# Patient Record
Sex: MALE | Race: WHITE | Employment: OTHER | URBAN - METROPOLITAN AREA
[De-identification: names, ages, dates, MRNs, and addresses within clinical notes are randomized per-mention and may not be internally consistent; named-entity substitution may affect disease eponyms.]

---

## 2017-01-01 ENCOUNTER — APPOINTMENT (OUTPATIENT)
Dept: SPEECH THERAPY | Facility: CLINIC | Age: 82
DRG: 865 | End: 2017-01-01
Attending: INTERNAL MEDICINE
Payer: COMMERCIAL

## 2017-01-01 ENCOUNTER — HOSPITAL ENCOUNTER (INPATIENT)
Facility: CLINIC | Age: 82
LOS: 6 days | DRG: 865 | End: 2017-02-12
Attending: EMERGENCY MEDICINE | Admitting: INTERNAL MEDICINE
Payer: COMMERCIAL

## 2017-01-01 ENCOUNTER — APPOINTMENT (OUTPATIENT)
Dept: GENERAL RADIOLOGY | Facility: CLINIC | Age: 82
DRG: 865 | End: 2017-01-01
Attending: INTERNAL MEDICINE
Payer: COMMERCIAL

## 2017-01-01 ENCOUNTER — APPOINTMENT (OUTPATIENT)
Dept: CT IMAGING | Facility: CLINIC | Age: 82
DRG: 865 | End: 2017-01-01
Attending: INTERNAL MEDICINE
Payer: COMMERCIAL

## 2017-01-01 VITALS
BODY MASS INDEX: 22.53 KG/M2 | WEIGHT: 152.12 LBS | HEART RATE: 103 BPM | DIASTOLIC BLOOD PRESSURE: 86 MMHG | OXYGEN SATURATION: 96 % | SYSTOLIC BLOOD PRESSURE: 132 MMHG | RESPIRATION RATE: 22 BRPM | TEMPERATURE: 97.6 F | HEIGHT: 69 IN

## 2017-01-01 DIAGNOSIS — B02.0 ZOSTER ENCEPHALITIS: ICD-10-CM

## 2017-01-01 DIAGNOSIS — M62.81 GENERALIZED MUSCLE WEAKNESS: ICD-10-CM

## 2017-01-01 DIAGNOSIS — R41.82 ALTERED MENTAL STATUS, UNSPECIFIED ALTERED MENTAL STATUS TYPE: ICD-10-CM

## 2017-01-01 LAB
ALBUMIN SERPL-MCNC: 3.4 G/DL (ref 3.4–5)
ALBUMIN UR-MCNC: 30 MG/DL
ALP SERPL-CCNC: 85 U/L (ref 40–150)
ALT SERPL W P-5'-P-CCNC: 15 U/L (ref 0–70)
ANION GAP SERPL CALCULATED.3IONS-SCNC: 11 MMOL/L (ref 3–14)
ANION GAP SERPL CALCULATED.3IONS-SCNC: 11 MMOL/L (ref 3–14)
ANION GAP SERPL CALCULATED.3IONS-SCNC: 8 MMOL/L (ref 3–14)
ANION GAP SERPL CALCULATED.3IONS-SCNC: 8 MMOL/L (ref 3–14)
ANION GAP SERPL CALCULATED.3IONS-SCNC: 9 MMOL/L (ref 3–14)
APPEARANCE UR: CLEAR
AST SERPL W P-5'-P-CCNC: 28 U/L (ref 0–45)
BACTERIA SPEC CULT: NO GROWTH
BACTERIA SPEC CULT: NO GROWTH
BASE DEFICIT BLDA-SCNC: 1.4 MMOL/L
BASOPHILS # BLD AUTO: 0 10E9/L (ref 0–0.2)
BASOPHILS NFR BLD AUTO: 0.8 %
BILIRUB SERPL-MCNC: 0.7 MG/DL (ref 0.2–1.3)
BILIRUB UR QL STRIP: NEGATIVE
BUN SERPL-MCNC: 31 MG/DL (ref 7–30)
BUN SERPL-MCNC: 32 MG/DL (ref 7–30)
BUN SERPL-MCNC: 32 MG/DL (ref 7–30)
BUN SERPL-MCNC: 33 MG/DL (ref 7–30)
BUN SERPL-MCNC: 34 MG/DL (ref 7–30)
CALCIUM SERPL-MCNC: 7.6 MG/DL (ref 8.5–10.1)
CALCIUM SERPL-MCNC: 7.9 MG/DL (ref 8.5–10.1)
CALCIUM SERPL-MCNC: 8 MG/DL (ref 8.5–10.1)
CALCIUM SERPL-MCNC: 8.1 MG/DL (ref 8.5–10.1)
CALCIUM SERPL-MCNC: 8.5 MG/DL (ref 8.5–10.1)
CHLORIDE SERPL-SCNC: 102 MMOL/L (ref 94–109)
CHLORIDE SERPL-SCNC: 103 MMOL/L (ref 94–109)
CHLORIDE SERPL-SCNC: 105 MMOL/L (ref 94–109)
CO2 BLDCOV-SCNC: 28 MMOL/L (ref 21–28)
CO2 SERPL-SCNC: 21 MMOL/L (ref 20–32)
CO2 SERPL-SCNC: 23 MMOL/L (ref 20–32)
CO2 SERPL-SCNC: 23 MMOL/L (ref 20–32)
CO2 SERPL-SCNC: 24 MMOL/L (ref 20–32)
CO2 SERPL-SCNC: 27 MMOL/L (ref 20–32)
COLOR UR AUTO: YELLOW
CREAT SERPL-MCNC: 1.67 MG/DL (ref 0.66–1.25)
CREAT SERPL-MCNC: 1.71 MG/DL (ref 0.66–1.25)
CREAT SERPL-MCNC: 1.73 MG/DL (ref 0.66–1.25)
CREAT SERPL-MCNC: 1.77 MG/DL (ref 0.66–1.25)
CREAT SERPL-MCNC: 1.79 MG/DL (ref 0.66–1.25)
CREAT SERPL-MCNC: 1.88 MG/DL (ref 0.66–1.25)
DIFFERENTIAL METHOD BLD: ABNORMAL
EOSINOPHIL # BLD AUTO: 0.1 10E9/L (ref 0–0.7)
EOSINOPHIL NFR BLD AUTO: 1.6 %
ERYTHROCYTE [DISTWIDTH] IN BLOOD BY AUTOMATED COUNT: 13.8 % (ref 10–15)
ERYTHROCYTE [DISTWIDTH] IN BLOOD BY AUTOMATED COUNT: 13.9 % (ref 10–15)
ERYTHROCYTE [DISTWIDTH] IN BLOOD BY AUTOMATED COUNT: 14.2 % (ref 10–15)
FLUAV+FLUBV AG SPEC QL: NEGATIVE
FLUAV+FLUBV AG SPEC QL: NORMAL
GFR SERPL CREATININE-BSD FRML MDRD: 34 ML/MIN/1.7M2
GFR SERPL CREATININE-BSD FRML MDRD: 36 ML/MIN/1.7M2
GFR SERPL CREATININE-BSD FRML MDRD: 36 ML/MIN/1.7M2
GFR SERPL CREATININE-BSD FRML MDRD: 37 ML/MIN/1.7M2
GFR SERPL CREATININE-BSD FRML MDRD: 38 ML/MIN/1.7M2
GFR SERPL CREATININE-BSD FRML MDRD: 39 ML/MIN/1.7M2
GLUCOSE SERPL-MCNC: 105 MG/DL (ref 70–99)
GLUCOSE SERPL-MCNC: 115 MG/DL (ref 70–99)
GLUCOSE SERPL-MCNC: 124 MG/DL (ref 70–99)
GLUCOSE SERPL-MCNC: 129 MG/DL (ref 70–99)
GLUCOSE SERPL-MCNC: 93 MG/DL (ref 70–99)
GLUCOSE UR STRIP-MCNC: NEGATIVE MG/DL
HCO3 BLD-SCNC: 23 MMOL/L (ref 21–28)
HCT VFR BLD AUTO: 29.9 % (ref 40–53)
HCT VFR BLD AUTO: 31.1 % (ref 40–53)
HCT VFR BLD AUTO: 32 % (ref 40–53)
HCT VFR BLD AUTO: 33.6 % (ref 40–53)
HCT VFR BLD AUTO: 38 % (ref 40–53)
HGB BLD-MCNC: 10.4 G/DL (ref 13.3–17.7)
HGB BLD-MCNC: 10.6 G/DL (ref 13.3–17.7)
HGB BLD-MCNC: 11 G/DL (ref 13.3–17.7)
HGB BLD-MCNC: 11.3 G/DL (ref 13.3–17.7)
HGB BLD-MCNC: 12.6 G/DL (ref 13.3–17.7)
HGB UR QL STRIP: ABNORMAL
HYALINE CASTS #/AREA URNS LPF: 1 /LPF (ref 0–2)
IMM GRANULOCYTES # BLD: 0 10E9/L (ref 0–0.4)
IMM GRANULOCYTES NFR BLD: 0.3 %
INR PPP: 1.31 (ref 0.86–1.14)
INR PPP: 1.4 (ref 0.86–1.14)
INR PPP: 1.81 (ref 0.86–1.14)
INR PPP: 1.83 (ref 0.86–1.14)
INR PPP: 2.04 (ref 0.86–1.14)
INR PPP: 2.24 (ref 0.86–1.14)
INTERPRETATION ECG - MUSE: NORMAL
KETONES UR STRIP-MCNC: NEGATIVE MG/DL
LACTATE BLD-SCNC: 1.4 MMOL/L (ref 0.7–2.1)
LACTATE BLD-SCNC: 1.8 MMOL/L (ref 0.7–2.1)
LEUKOCYTE ESTERASE UR QL STRIP: NEGATIVE
LMWH PPP CHRO-ACNC: 0.14 IU/ML
LMWH PPP CHRO-ACNC: 0.2 IU/ML
LMWH PPP CHRO-ACNC: 0.23 IU/ML
LMWH PPP CHRO-ACNC: 0.29 IU/ML
LYMPHOCYTES # BLD AUTO: 0.8 10E9/L (ref 0.8–5.3)
LYMPHOCYTES NFR BLD AUTO: 21.1 %
Lab: NORMAL
Lab: NORMAL
MCH RBC QN AUTO: 31.6 PG (ref 26.5–33)
MCH RBC QN AUTO: 31.9 PG (ref 26.5–33)
MCH RBC QN AUTO: 32 PG (ref 26.5–33)
MCH RBC QN AUTO: 32.3 PG (ref 26.5–33)
MCH RBC QN AUTO: 32.6 PG (ref 26.5–33)
MCHC RBC AUTO-ENTMCNC: 33.2 G/DL (ref 31.5–36.5)
MCHC RBC AUTO-ENTMCNC: 33.6 G/DL (ref 31.5–36.5)
MCHC RBC AUTO-ENTMCNC: 34.1 G/DL (ref 31.5–36.5)
MCHC RBC AUTO-ENTMCNC: 34.4 G/DL (ref 31.5–36.5)
MCHC RBC AUTO-ENTMCNC: 34.8 G/DL (ref 31.5–36.5)
MCV RBC AUTO: 92 FL (ref 78–100)
MCV RBC AUTO: 93 FL (ref 78–100)
MCV RBC AUTO: 94 FL (ref 78–100)
MCV RBC AUTO: 95 FL (ref 78–100)
MCV RBC AUTO: 98 FL (ref 78–100)
MICRO REPORT STATUS: NORMAL
MICRO REPORT STATUS: NORMAL
MONOCYTES # BLD AUTO: 0.4 10E9/L (ref 0–1.3)
MONOCYTES NFR BLD AUTO: 10.1 %
NEUTROPHILS # BLD AUTO: 2.5 10E9/L (ref 1.6–8.3)
NEUTROPHILS NFR BLD AUTO: 66.1 %
NITRATE UR QL: NEGATIVE
NRBC # BLD AUTO: 0 10*3/UL
NRBC BLD AUTO-RTO: 0 /100
OXYHGB MFR BLD: 97 % (ref 92–100)
PCO2 BLD: 33 MM HG (ref 35–45)
PCO2 BLDV: 47 MM HG (ref 40–50)
PH BLD: 7.44 PH (ref 7.35–7.45)
PH BLDV: 7.38 PH (ref 7.32–7.43)
PH UR STRIP: 6.5 PH (ref 5–7)
PLATELET # BLD AUTO: 100 10E9/L (ref 150–450)
PLATELET # BLD AUTO: 118 10E9/L (ref 150–450)
PLATELET # BLD AUTO: 126 10E9/L (ref 150–450)
PLATELET # BLD AUTO: 86 10E9/L (ref 150–450)
PLATELET # BLD AUTO: 99 10E9/L (ref 150–450)
PO2 BLD: 78 MM HG (ref 80–105)
PO2 BLDV: 39 MM HG (ref 25–47)
POTASSIUM SERPL-SCNC: 3.7 MMOL/L (ref 3.4–5.3)
POTASSIUM SERPL-SCNC: 3.8 MMOL/L (ref 3.4–5.3)
POTASSIUM SERPL-SCNC: 3.9 MMOL/L (ref 3.4–5.3)
POTASSIUM SERPL-SCNC: 4.5 MMOL/L (ref 3.4–5.3)
POTASSIUM SERPL-SCNC: 4.8 MMOL/L (ref 3.4–5.3)
PROT SERPL-MCNC: 6.8 G/DL (ref 6.8–8.8)
RBC # BLD AUTO: 3.25 10E12/L (ref 4.4–5.9)
RBC # BLD AUTO: 3.35 10E12/L (ref 4.4–5.9)
RBC # BLD AUTO: 3.41 10E12/L (ref 4.4–5.9)
RBC # BLD AUTO: 3.54 10E12/L (ref 4.4–5.9)
RBC # BLD AUTO: 3.87 10E12/L (ref 4.4–5.9)
RBC #/AREA URNS AUTO: 13 /HPF (ref 0–2)
SAO2 % BLDV FROM PO2: 72 %
SODIUM SERPL-SCNC: 134 MMOL/L (ref 133–144)
SODIUM SERPL-SCNC: 134 MMOL/L (ref 133–144)
SODIUM SERPL-SCNC: 137 MMOL/L (ref 133–144)
SP GR UR STRIP: 1.01 (ref 1–1.03)
SPECIMEN SOURCE: NORMAL
TROPONIN I SERPL-MCNC: NORMAL UG/L (ref 0–0.04)
URN SPEC COLLECT METH UR: ABNORMAL
UROBILINOGEN UR STRIP-MCNC: NORMAL MG/DL (ref 0–2)
WBC # BLD AUTO: 3.8 10E9/L (ref 4–11)
WBC # BLD AUTO: 4.4 10E9/L (ref 4–11)
WBC # BLD AUTO: 5.1 10E9/L (ref 4–11)
WBC # BLD AUTO: 5.5 10E9/L (ref 4–11)
WBC # BLD AUTO: 5.6 10E9/L (ref 4–11)
WBC #/AREA URNS AUTO: 0 /HPF (ref 0–2)

## 2017-01-01 PROCEDURE — 25000125 ZZHC RX 250: Performed by: INTERNAL MEDICINE

## 2017-01-01 PROCEDURE — 36415 COLL VENOUS BLD VENIPUNCTURE: CPT | Performed by: INTERNAL MEDICINE

## 2017-01-01 PROCEDURE — 99233 SBSQ HOSP IP/OBS HIGH 50: CPT | Performed by: HOSPITALIST

## 2017-01-01 PROCEDURE — 25000125 ZZHC RX 250: Performed by: EMERGENCY MEDICINE

## 2017-01-01 PROCEDURE — 40000915 ZZH STATISTIC SITTER, EVENING HOURS

## 2017-01-01 PROCEDURE — 25000125 ZZHC RX 250: Performed by: HOSPITALIST

## 2017-01-01 PROCEDURE — 25000128 H RX IP 250 OP 636: Performed by: INTERNAL MEDICINE

## 2017-01-01 PROCEDURE — 85025 COMPLETE CBC W/AUTO DIFF WBC: CPT | Performed by: EMERGENCY MEDICINE

## 2017-01-01 PROCEDURE — 12000000 ZZH R&B MED SURG/OB

## 2017-01-01 PROCEDURE — 40000141 ZZH STATISTIC PERIPHERAL IV START W/O US GUIDANCE

## 2017-01-01 PROCEDURE — 80048 BASIC METABOLIC PNL TOTAL CA: CPT | Performed by: INTERNAL MEDICINE

## 2017-01-01 PROCEDURE — 99211 OFF/OP EST MAY X REQ PHY/QHP: CPT

## 2017-01-01 PROCEDURE — 51701 INSERT BLADDER CATHETER: CPT

## 2017-01-01 PROCEDURE — 82565 ASSAY OF CREATININE: CPT | Performed by: INTERNAL MEDICINE

## 2017-01-01 PROCEDURE — 85520 HEPARIN ASSAY: CPT | Performed by: INTERNAL MEDICINE

## 2017-01-01 PROCEDURE — 40000916 ZZH STATISTIC SITTER, NIGHT HOURS

## 2017-01-01 PROCEDURE — 70470 CT HEAD/BRAIN W/O & W/DYE: CPT

## 2017-01-01 PROCEDURE — 25000132 ZZH RX MED GY IP 250 OP 250 PS 637

## 2017-01-01 PROCEDURE — 25500064 ZZH RX 255 OP 636: Performed by: INTERNAL MEDICINE

## 2017-01-01 PROCEDURE — 99285 EMERGENCY DEPT VISIT HI MDM: CPT | Mod: 25

## 2017-01-01 PROCEDURE — 99232 SBSQ HOSP IP/OBS MODERATE 35: CPT | Performed by: INTERNAL MEDICINE

## 2017-01-01 PROCEDURE — 99233 SBSQ HOSP IP/OBS HIGH 50: CPT | Performed by: INTERNAL MEDICINE

## 2017-01-01 PROCEDURE — 81001 URINALYSIS AUTO W/SCOPE: CPT | Performed by: EMERGENCY MEDICINE

## 2017-01-01 PROCEDURE — 99233 SBSQ HOSP IP/OBS HIGH 50: CPT | Performed by: PSYCHIATRY & NEUROLOGY

## 2017-01-01 PROCEDURE — 93005 ELECTROCARDIOGRAM TRACING: CPT

## 2017-01-01 PROCEDURE — 87040 BLOOD CULTURE FOR BACTERIA: CPT | Performed by: INTERNAL MEDICINE

## 2017-01-01 PROCEDURE — 71010 XR CHEST PORT 1 VW: CPT

## 2017-01-01 PROCEDURE — 83605 ASSAY OF LACTIC ACID: CPT | Performed by: INTERNAL MEDICINE

## 2017-01-01 PROCEDURE — 25000132 ZZH RX MED GY IP 250 OP 250 PS 637: Performed by: INTERNAL MEDICINE

## 2017-01-01 PROCEDURE — 85027 COMPLETE CBC AUTOMATED: CPT | Performed by: INTERNAL MEDICINE

## 2017-01-01 PROCEDURE — 36600 WITHDRAWAL OF ARTERIAL BLOOD: CPT

## 2017-01-01 PROCEDURE — 82805 BLOOD GASES W/O2 SATURATION: CPT | Performed by: INTERNAL MEDICINE

## 2017-01-01 PROCEDURE — 25000128 H RX IP 250 OP 636: Performed by: HOSPITALIST

## 2017-01-01 PROCEDURE — 25800025 ZZH RX 258: Performed by: INTERNAL MEDICINE

## 2017-01-01 PROCEDURE — 85610 PROTHROMBIN TIME: CPT | Performed by: INTERNAL MEDICINE

## 2017-01-01 PROCEDURE — 96365 THER/PROPH/DIAG IV INF INIT: CPT

## 2017-01-01 PROCEDURE — 40000275 ZZH STATISTIC RCP TIME EA 10 MIN

## 2017-01-01 PROCEDURE — 82803 BLOOD GASES ANY COMBINATION: CPT

## 2017-01-01 PROCEDURE — 85610 PROTHROMBIN TIME: CPT | Performed by: EMERGENCY MEDICINE

## 2017-01-01 PROCEDURE — 25000132 ZZH RX MED GY IP 250 OP 250 PS 637: Performed by: HOSPITALIST

## 2017-01-01 PROCEDURE — 87804 INFLUENZA ASSAY W/OPTIC: CPT | Performed by: EMERGENCY MEDICINE

## 2017-01-01 PROCEDURE — 96361 HYDRATE IV INFUSION ADD-ON: CPT

## 2017-01-01 PROCEDURE — 25000128 H RX IP 250 OP 636: Performed by: EMERGENCY MEDICINE

## 2017-01-01 PROCEDURE — 84484 ASSAY OF TROPONIN QUANT: CPT | Performed by: EMERGENCY MEDICINE

## 2017-01-01 PROCEDURE — 99222 1ST HOSP IP/OBS MODERATE 55: CPT | Mod: AI | Performed by: INTERNAL MEDICINE

## 2017-01-01 PROCEDURE — 92610 EVALUATE SWALLOWING FUNCTION: CPT | Mod: GN | Performed by: SPEECH-LANGUAGE PATHOLOGIST

## 2017-01-01 PROCEDURE — 87040 BLOOD CULTURE FOR BACTERIA: CPT | Performed by: EMERGENCY MEDICINE

## 2017-01-01 PROCEDURE — 40000225 ZZH STATISTIC SLP WARD VISIT: Performed by: SPEECH-LANGUAGE PATHOLOGIST

## 2017-01-01 PROCEDURE — 83605 ASSAY OF LACTIC ACID: CPT

## 2017-01-01 PROCEDURE — 99231 SBSQ HOSP IP/OBS SF/LOW 25: CPT | Performed by: HOSPITALIST

## 2017-01-01 PROCEDURE — 80053 COMPREHEN METABOLIC PANEL: CPT | Performed by: EMERGENCY MEDICINE

## 2017-01-01 RX ORDER — ATROPINE SULFATE 10 MG/ML
1-2 SOLUTION/ DROPS OPHTHALMIC
Status: DISCONTINUED | OUTPATIENT
Start: 2017-01-01 | End: 2017-01-01 | Stop reason: HOSPADM

## 2017-01-01 RX ORDER — ALBUTEROL SULFATE 0.83 MG/ML
3 SOLUTION RESPIRATORY (INHALATION)
Status: DISCONTINUED | OUTPATIENT
Start: 2017-01-01 | End: 2017-01-01 | Stop reason: HOSPADM

## 2017-01-01 RX ORDER — LORAZEPAM 2 MG/ML
.5-1 INJECTION INTRAMUSCULAR
Status: DISCONTINUED | OUTPATIENT
Start: 2017-01-01 | End: 2017-01-01 | Stop reason: HOSPADM

## 2017-01-01 RX ORDER — NALOXONE HYDROCHLORIDE 0.4 MG/ML
.1-.4 INJECTION, SOLUTION INTRAMUSCULAR; INTRAVENOUS; SUBCUTANEOUS
Status: DISCONTINUED | OUTPATIENT
Start: 2017-01-01 | End: 2017-01-01 | Stop reason: HOSPADM

## 2017-01-01 RX ORDER — HYDRALAZINE HYDROCHLORIDE 20 MG/ML
10 INJECTION INTRAMUSCULAR; INTRAVENOUS EVERY 4 HOURS PRN
Status: DISCONTINUED | OUTPATIENT
Start: 2017-01-01 | End: 2017-01-01

## 2017-01-01 RX ORDER — LORAZEPAM 0.5 MG/1
.5-1 TABLET ORAL
Status: DISCONTINUED | OUTPATIENT
Start: 2017-01-01 | End: 2017-01-01 | Stop reason: HOSPADM

## 2017-01-01 RX ORDER — HALOPERIDOL 5 MG/ML
2 INJECTION INTRAMUSCULAR EVERY 6 HOURS PRN
Status: DISCONTINUED | OUTPATIENT
Start: 2017-01-01 | End: 2017-01-01 | Stop reason: HOSPADM

## 2017-01-01 RX ORDER — HALOPERIDOL 1 MG/1
1 TABLET ORAL EVERY 6 HOURS PRN
Status: DISCONTINUED | OUTPATIENT
Start: 2017-01-01 | End: 2017-01-01

## 2017-01-01 RX ORDER — QUETIAPINE FUMARATE 25 MG/1
25 TABLET, FILM COATED ORAL
Status: DISCONTINUED | OUTPATIENT
Start: 2017-01-01 | End: 2017-01-01

## 2017-01-01 RX ORDER — NALOXONE HYDROCHLORIDE 0.4 MG/ML
.1-.4 INJECTION, SOLUTION INTRAMUSCULAR; INTRAVENOUS; SUBCUTANEOUS
Status: DISCONTINUED | OUTPATIENT
Start: 2017-01-01 | End: 2017-01-01

## 2017-01-01 RX ORDER — AMOXICILLIN 250 MG
1-2 CAPSULE ORAL 2 TIMES DAILY
Status: DISCONTINUED | OUTPATIENT
Start: 2017-01-01 | End: 2017-01-01

## 2017-01-01 RX ORDER — ACETAMINOPHEN 325 MG/1
650 TABLET ORAL EVERY 4 HOURS PRN
Status: DISCONTINUED | OUTPATIENT
Start: 2017-01-01 | End: 2017-01-01 | Stop reason: HOSPADM

## 2017-01-01 RX ORDER — TETRAHYDROZOLINE HCL 0.05 %
DROPS OPHTHALMIC (EYE) PRN
COMMUNITY

## 2017-01-01 RX ORDER — MOXIFLOXACIN 5 MG/ML
1 SOLUTION/ DROPS OPHTHALMIC 3 TIMES DAILY
Status: DISCONTINUED | OUTPATIENT
Start: 2017-01-01 | End: 2017-01-01

## 2017-01-01 RX ORDER — ATORVASTATIN CALCIUM 10 MG/1
10 TABLET, FILM COATED ORAL DAILY
Status: DISCONTINUED | OUTPATIENT
Start: 2017-01-01 | End: 2017-01-01

## 2017-01-01 RX ORDER — ALLOPURINOL 300 MG/1
300 TABLET ORAL DAILY
Status: DISCONTINUED | OUTPATIENT
Start: 2017-01-01 | End: 2017-01-01

## 2017-01-01 RX ORDER — FLUTICASONE PROPIONATE 50 MCG
1 SPRAY, SUSPENSION (ML) NASAL DAILY
Status: DISCONTINUED | OUTPATIENT
Start: 2017-01-01 | End: 2017-01-01 | Stop reason: HOSPADM

## 2017-01-01 RX ORDER — HYDROMORPHONE HYDROCHLORIDE 1 MG/ML
.3-.5 INJECTION, SOLUTION INTRAMUSCULAR; INTRAVENOUS; SUBCUTANEOUS
Status: DISCONTINUED | OUTPATIENT
Start: 2017-01-01 | End: 2017-01-01

## 2017-01-01 RX ORDER — SODIUM CHLORIDE 9 MG/ML
1000 INJECTION, SOLUTION INTRAVENOUS CONTINUOUS
Status: DISCONTINUED | OUTPATIENT
Start: 2017-01-01 | End: 2017-01-01

## 2017-01-01 RX ORDER — AMOXICILLIN 250 MG
1-2 CAPSULE ORAL 2 TIMES DAILY PRN
Status: DISCONTINUED | OUTPATIENT
Start: 2017-01-01 | End: 2017-01-01

## 2017-01-01 RX ORDER — VIT C/E/ZN/COPPR/LUTEIN/ZEAXAN 60 MG-6 MG
1 CAPSULE ORAL DAILY
Status: DISCONTINUED | OUTPATIENT
Start: 2017-01-01 | End: 2017-01-01

## 2017-01-01 RX ORDER — SODIUM CHLORIDE 9 MG/ML
INJECTION, SOLUTION INTRAVENOUS CONTINUOUS
Status: DISCONTINUED | OUTPATIENT
Start: 2017-01-01 | End: 2017-01-01

## 2017-01-01 RX ORDER — MOXIFLOXACIN 5 MG/ML
1 SOLUTION/ DROPS OPHTHALMIC
Status: DISCONTINUED | OUTPATIENT
Start: 2017-01-01 | End: 2017-01-01

## 2017-01-01 RX ORDER — BISACODYL 10 MG
10 SUPPOSITORY, RECTAL RECTAL DAILY PRN
Status: DISCONTINUED | OUTPATIENT
Start: 2017-01-01 | End: 2017-01-01 | Stop reason: HOSPADM

## 2017-01-01 RX ORDER — MORPHINE SULFATE 10 MG/5ML
5-10 SOLUTION ORAL
Status: DISCONTINUED | OUTPATIENT
Start: 2017-01-01 | End: 2017-01-01 | Stop reason: HOSPADM

## 2017-01-01 RX ORDER — VIT C/E/ZN/COPPR/LUTEIN/ZEAXAN 60 MG-6 MG
1 CAPSULE ORAL
Status: DISCONTINUED | OUTPATIENT
Start: 2017-01-01 | End: 2017-01-01

## 2017-01-01 RX ORDER — LEVOFLOXACIN 5 MG/ML
500 INJECTION, SOLUTION INTRAVENOUS EVERY 24 HOURS
Status: DISCONTINUED | OUTPATIENT
Start: 2017-01-01 | End: 2017-01-01

## 2017-01-01 RX ORDER — ATORVASTATIN CALCIUM 10 MG/1
10 TABLET, FILM COATED ORAL
Status: DISCONTINUED | OUTPATIENT
Start: 2017-01-01 | End: 2017-01-01

## 2017-01-01 RX ORDER — WARFARIN SODIUM 5 MG/1
5 TABLET ORAL
Status: COMPLETED | OUTPATIENT
Start: 2017-01-01 | End: 2017-01-01

## 2017-01-01 RX ORDER — MORPHINE SULFATE 2 MG/ML
1-2 INJECTION, SOLUTION INTRAMUSCULAR; INTRAVENOUS
Status: DISCONTINUED | OUTPATIENT
Start: 2017-01-01 | End: 2017-01-01 | Stop reason: HOSPADM

## 2017-01-01 RX ORDER — PIPERACILLIN SODIUM, TAZOBACTAM SODIUM 3; .375 G/15ML; G/15ML
3.38 INJECTION, POWDER, LYOPHILIZED, FOR SOLUTION INTRAVENOUS EVERY 6 HOURS
Status: DISCONTINUED | OUTPATIENT
Start: 2017-01-01 | End: 2017-01-01

## 2017-01-01 RX ORDER — ALLOPURINOL 300 MG/1
300 TABLET ORAL
Status: DISCONTINUED | OUTPATIENT
Start: 2017-01-01 | End: 2017-01-01

## 2017-01-01 RX ORDER — FUROSEMIDE 10 MG/ML
40 INJECTION INTRAMUSCULAR; INTRAVENOUS ONCE
Status: COMPLETED | OUTPATIENT
Start: 2017-01-01 | End: 2017-01-01

## 2017-01-01 RX ORDER — HYDRALAZINE HYDROCHLORIDE 20 MG/ML
10 INJECTION INTRAMUSCULAR; INTRAVENOUS
Status: DISCONTINUED | OUTPATIENT
Start: 2017-01-01 | End: 2017-01-01

## 2017-01-01 RX ORDER — AMOXICILLIN 250 MG
1-2 CAPSULE ORAL
Status: DISCONTINUED | OUTPATIENT
Start: 2017-01-01 | End: 2017-01-01

## 2017-01-01 RX ORDER — ONDANSETRON 4 MG/1
4 TABLET, ORALLY DISINTEGRATING ORAL EVERY 6 HOURS PRN
Status: DISCONTINUED | OUTPATIENT
Start: 2017-01-01 | End: 2017-01-01

## 2017-01-01 RX ORDER — LEVOFLOXACIN 5 MG/ML
750 INJECTION, SOLUTION INTRAVENOUS EVERY 24 HOURS
Status: DISCONTINUED | OUTPATIENT
Start: 2017-01-01 | End: 2017-01-01

## 2017-01-01 RX ORDER — DEXTROSE MONOHYDRATE, SODIUM CHLORIDE, AND POTASSIUM CHLORIDE 50; 1.49; 4.5 G/1000ML; G/1000ML; G/1000ML
INJECTION, SOLUTION INTRAVENOUS CONTINUOUS
Status: DISCONTINUED | OUTPATIENT
Start: 2017-01-01 | End: 2017-01-01

## 2017-01-01 RX ORDER — METOPROLOL TARTRATE 25 MG/1
25 TABLET, FILM COATED ORAL
Status: DISCONTINUED | OUTPATIENT
Start: 2017-01-01 | End: 2017-01-01

## 2017-01-01 RX ORDER — METOPROLOL TARTRATE 25 MG/1
25 TABLET, FILM COATED ORAL 2 TIMES DAILY
Status: DISCONTINUED | OUTPATIENT
Start: 2017-01-01 | End: 2017-01-01

## 2017-01-01 RX ORDER — ONDANSETRON 2 MG/ML
4 INJECTION INTRAMUSCULAR; INTRAVENOUS EVERY 6 HOURS PRN
Status: DISCONTINUED | OUTPATIENT
Start: 2017-01-01 | End: 2017-01-01 | Stop reason: HOSPADM

## 2017-01-01 RX ORDER — QUETIAPINE FUMARATE 25 MG/1
25 TABLET, FILM COATED ORAL AT BEDTIME
Status: DISCONTINUED | OUTPATIENT
Start: 2017-01-01 | End: 2017-01-01

## 2017-01-01 RX ORDER — MORPHINE SULFATE 20 MG/ML
5-10 SOLUTION ORAL
Status: DISCONTINUED | OUTPATIENT
Start: 2017-01-01 | End: 2017-01-01 | Stop reason: HOSPADM

## 2017-01-01 RX ORDER — IOPAMIDOL 755 MG/ML
50 INJECTION, SOLUTION INTRAVASCULAR ONCE
Status: COMPLETED | OUTPATIENT
Start: 2017-01-01 | End: 2017-01-01

## 2017-01-01 RX ADMIN — METOPROLOL TARTRATE 5 MG: 5 INJECTION INTRAVENOUS at 16:00

## 2017-01-01 RX ADMIN — MOXIFLOXACIN HYDROCHLORIDE 1 DROP: 5 SOLUTION/ DROPS OPHTHALMIC at 16:04

## 2017-01-01 RX ADMIN — ACYCLOVIR SODIUM 800 MG: 50 INJECTION, SOLUTION INTRAVENOUS at 14:24

## 2017-01-01 RX ADMIN — ATROPINE SULFATE 2 DROP: 10 SOLUTION/ DROPS OPHTHALMIC at 09:59

## 2017-01-01 RX ADMIN — ACYCLOVIR SODIUM 800 MG: 50 INJECTION, SOLUTION INTRAVENOUS at 03:26

## 2017-01-01 RX ADMIN — HYDRALAZINE HYDROCHLORIDE 10 MG: 20 INJECTION INTRAMUSCULAR; INTRAVENOUS at 01:39

## 2017-01-01 RX ADMIN — MORPHINE SULFATE 5 MG: 100 SOLUTION ORAL at 17:55

## 2017-01-01 RX ADMIN — METOPROLOL TARTRATE 5 MG: 5 INJECTION INTRAVENOUS at 03:26

## 2017-01-01 RX ADMIN — PIPERACILLIN AND TAZOBACTAM 3.38 G: 3; .375 INJECTION, POWDER, FOR SOLUTION INTRAVENOUS at 02:05

## 2017-01-01 RX ADMIN — METOPROLOL TARTRATE 5 MG: 5 INJECTION INTRAVENOUS at 09:53

## 2017-01-01 RX ADMIN — METOPROLOL TARTRATE 5 MG: 5 INJECTION INTRAVENOUS at 22:05

## 2017-01-01 RX ADMIN — FUROSEMIDE 40 MG: 10 INJECTION, SOLUTION INTRAMUSCULAR; INTRAVENOUS at 01:29

## 2017-01-01 RX ADMIN — ALLOPURINOL 300 MG: 300 TABLET ORAL at 08:32

## 2017-01-01 RX ADMIN — PIPERACILLIN AND TAZOBACTAM 3.38 G: 3; .375 INJECTION, POWDER, FOR SOLUTION INTRAVENOUS at 01:41

## 2017-01-01 RX ADMIN — VANCOMYCIN HYDROCHLORIDE 1500 MG: 5 INJECTION, POWDER, LYOPHILIZED, FOR SOLUTION INTRAVENOUS at 06:53

## 2017-01-01 RX ADMIN — HEPARIN SODIUM 800 UNITS/HR: 10000 INJECTION, SOLUTION INTRAVENOUS at 16:27

## 2017-01-01 RX ADMIN — HEPARIN SODIUM 900 UNITS/HR: 10000 INJECTION, SOLUTION INTRAVENOUS at 20:44

## 2017-01-01 RX ADMIN — FLUTICASONE PROPIONATE 1 SPRAY: 50 SPRAY, METERED NASAL at 08:40

## 2017-01-01 RX ADMIN — ATORVASTATIN CALCIUM 10 MG: 10 TABLET, FILM COATED ORAL at 08:33

## 2017-01-01 RX ADMIN — WARFARIN SODIUM 5 MG: 5 TABLET ORAL at 18:56

## 2017-01-01 RX ADMIN — MORPHINE SULFATE 5 MG: 100 SOLUTION ORAL at 10:08

## 2017-01-01 RX ADMIN — SODIUM CHLORIDE: 9 INJECTION, SOLUTION INTRAVENOUS at 14:59

## 2017-01-01 RX ADMIN — SODIUM CHLORIDE 1000 ML: 9 INJECTION, SOLUTION INTRAVENOUS at 22:01

## 2017-01-01 RX ADMIN — Medication 12.5 MG: at 12:45

## 2017-01-01 RX ADMIN — SENNOSIDES AND DOCUSATE SODIUM 1 TABLET: 8.6; 5 TABLET ORAL at 20:16

## 2017-01-01 RX ADMIN — PIPERACILLIN AND TAZOBACTAM 3.38 G: 3; .375 INJECTION, POWDER, FOR SOLUTION INTRAVENOUS at 08:32

## 2017-01-01 RX ADMIN — SODIUM CHLORIDE 60 ML: 9 INJECTION, SOLUTION INTRAVENOUS at 14:35

## 2017-01-01 RX ADMIN — ACYCLOVIR SODIUM 800 MG: 50 INJECTION, SOLUTION INTRAVENOUS at 15:02

## 2017-01-01 RX ADMIN — SENNOSIDES AND DOCUSATE SODIUM 2 TABLET: 8.6; 5 TABLET ORAL at 05:07

## 2017-01-01 RX ADMIN — HYDRALAZINE HYDROCHLORIDE 10 MG: 20 INJECTION INTRAMUSCULAR; INTRAVENOUS at 08:28

## 2017-01-01 RX ADMIN — PIPERACILLIN AND TAZOBACTAM 3.38 G: 3; .375 INJECTION, POWDER, FOR SOLUTION INTRAVENOUS at 14:39

## 2017-01-01 RX ADMIN — HEPARIN SODIUM 900 UNITS/HR: 10000 INJECTION, SOLUTION INTRAVENOUS at 20:20

## 2017-01-01 RX ADMIN — ACYCLOVIR SODIUM 800 MG: 50 INJECTION, SOLUTION INTRAVENOUS at 01:41

## 2017-01-01 RX ADMIN — ATROPINE SULFATE 2 DROP: 10 SOLUTION/ DROPS OPHTHALMIC at 16:21

## 2017-01-01 RX ADMIN — PHYTONADIONE 1 MG: 10 INJECTION, EMULSION INTRAMUSCULAR; INTRAVENOUS; SUBCUTANEOUS at 12:46

## 2017-01-01 RX ADMIN — SODIUM CHLORIDE: 9 INJECTION, SOLUTION INTRAVENOUS at 10:52

## 2017-01-01 RX ADMIN — METOPROLOL TARTRATE 5 MG: 5 INJECTION INTRAVENOUS at 14:39

## 2017-01-01 RX ADMIN — PIPERACILLIN AND TAZOBACTAM 3.38 G: 3; .375 INJECTION, POWDER, FOR SOLUTION INTRAVENOUS at 08:54

## 2017-01-01 RX ADMIN — MORPHINE SULFATE 5 MG: 100 SOLUTION ORAL at 15:10

## 2017-01-01 RX ADMIN — MORPHINE SULFATE 2 MG: 2 INJECTION, SOLUTION INTRAMUSCULAR; INTRAVENOUS at 12:22

## 2017-01-01 RX ADMIN — ACYCLOVIR SODIUM 800 MG: 50 INJECTION, SOLUTION INTRAVENOUS at 02:56

## 2017-01-01 RX ADMIN — MOXIFLOXACIN HYDROCHLORIDE 1 DROP: 5 SOLUTION/ DROPS OPHTHALMIC at 08:57

## 2017-01-01 RX ADMIN — PIPERACILLIN AND TAZOBACTAM 3.38 G: 3; .375 INJECTION, POWDER, FOR SOLUTION INTRAVENOUS at 20:18

## 2017-01-01 RX ADMIN — ACYCLOVIR SODIUM 800 MG: 50 INJECTION, SOLUTION INTRAVENOUS at 15:05

## 2017-01-01 RX ADMIN — SODIUM CHLORIDE: 9 INJECTION, SOLUTION INTRAVENOUS at 05:08

## 2017-01-01 RX ADMIN — FLUTICASONE PROPIONATE 1 SPRAY: 50 SPRAY, METERED NASAL at 20:22

## 2017-01-01 RX ADMIN — METOPROLOL TARTRATE 25 MG: 25 TABLET, FILM COATED ORAL at 20:16

## 2017-01-01 RX ADMIN — MOXIFLOXACIN HYDROCHLORIDE 1 DROP: 5 SOLUTION/ DROPS OPHTHALMIC at 21:49

## 2017-01-01 RX ADMIN — MOXIFLOXACIN HYDROCHLORIDE 1 DROP: 5 SOLUTION/ DROPS OPHTHALMIC at 22:08

## 2017-01-01 RX ADMIN — SODIUM CHLORIDE: 9 INJECTION, SOLUTION INTRAVENOUS at 19:18

## 2017-01-01 RX ADMIN — HALOPERIDOL LACTATE 2 MG: 5 INJECTION, SOLUTION INTRAMUSCULAR at 14:36

## 2017-01-01 RX ADMIN — POTASSIUM CHLORIDE, DEXTROSE MONOHYDRATE AND SODIUM CHLORIDE: 150; 5; 450 INJECTION, SOLUTION INTRAVENOUS at 12:26

## 2017-01-01 RX ADMIN — LEVOFLOXACIN 500 MG: 5 INJECTION, SOLUTION INTRAVENOUS at 05:44

## 2017-01-01 RX ADMIN — MORPHINE SULFATE 5 MG: 100 SOLUTION ORAL at 13:33

## 2017-01-01 RX ADMIN — METOPROLOL TARTRATE 5 MG: 5 INJECTION INTRAVENOUS at 08:32

## 2017-01-01 RX ADMIN — MORPHINE SULFATE 1 MG: 2 INJECTION, SOLUTION INTRAMUSCULAR; INTRAVENOUS at 11:32

## 2017-01-01 RX ADMIN — ACYCLOVIR SODIUM 800 MG: 50 INJECTION, SOLUTION INTRAVENOUS at 01:40

## 2017-01-01 RX ADMIN — ACYCLOVIR SODIUM 800 MG: 50 INJECTION, SOLUTION INTRAVENOUS at 01:11

## 2017-01-01 RX ADMIN — METOPROLOL TARTRATE 5 MG: 5 INJECTION INTRAVENOUS at 06:04

## 2017-01-01 RX ADMIN — SODIUM CHLORIDE: 9 INJECTION, SOLUTION INTRAVENOUS at 06:47

## 2017-01-01 RX ADMIN — METOPROLOL TARTRATE 5 MG: 5 INJECTION INTRAVENOUS at 17:26

## 2017-01-01 RX ADMIN — METOPROLOL TARTRATE 5 MG: 5 INJECTION INTRAVENOUS at 13:02

## 2017-01-01 RX ADMIN — FLUTICASONE PROPIONATE 1 SPRAY: 50 SPRAY, METERED NASAL at 08:28

## 2017-01-01 RX ADMIN — MOXIFLOXACIN HYDROCHLORIDE 1 DROP: 5 SOLUTION/ DROPS OPHTHALMIC at 08:32

## 2017-01-01 RX ADMIN — POTASSIUM CHLORIDE, DEXTROSE MONOHYDRATE AND SODIUM CHLORIDE: 150; 5; 450 INJECTION, SOLUTION INTRAVENOUS at 10:20

## 2017-01-01 RX ADMIN — HALOPERIDOL LACTATE 2 MG: 5 INJECTION, SOLUTION INTRAMUSCULAR at 02:49

## 2017-01-01 RX ADMIN — ACYCLOVIR SODIUM 800 MG: 50 INJECTION, SOLUTION INTRAVENOUS at 04:10

## 2017-01-01 RX ADMIN — VITAMIN D, TAB 1000IU (100/BT) 1000 UNITS: 25 TAB at 11:41

## 2017-01-01 RX ADMIN — ONDANSETRON 4 MG: 4 TABLET, ORALLY DISINTEGRATING ORAL at 12:13

## 2017-01-01 RX ADMIN — METOPROLOL TARTRATE 25 MG: 25 TABLET, FILM COATED ORAL at 08:33

## 2017-01-01 RX ADMIN — ACYCLOVIR SODIUM 800 MG: 50 INJECTION, SOLUTION INTRAVENOUS at 14:01

## 2017-01-01 RX ADMIN — ACYCLOVIR SODIUM 800 MG: 50 INJECTION, SOLUTION INTRAVENOUS at 16:04

## 2017-01-01 RX ADMIN — METOPROLOL TARTRATE 5 MG: 5 INJECTION INTRAVENOUS at 10:43

## 2017-01-01 RX ADMIN — METOPROLOL TARTRATE 5 MG: 5 INJECTION INTRAVENOUS at 21:49

## 2017-01-01 RX ADMIN — ONDANSETRON HYDROCHLORIDE 4 MG: 2 SOLUTION INTRAMUSCULAR; INTRAVENOUS at 05:06

## 2017-01-01 RX ADMIN — POTASSIUM CHLORIDE, DEXTROSE MONOHYDRATE AND SODIUM CHLORIDE: 150; 5; 450 INJECTION, SOLUTION INTRAVENOUS at 08:54

## 2017-01-01 RX ADMIN — POTASSIUM CHLORIDE, DEXTROSE MONOHYDRATE AND SODIUM CHLORIDE: 150; 5; 450 INJECTION, SOLUTION INTRAVENOUS at 09:56

## 2017-01-01 RX ADMIN — Medication 1 CAPSULE: at 08:33

## 2017-01-01 RX ADMIN — Medication 2.5 MG: at 20:16

## 2017-01-01 RX ADMIN — METOPROLOL TARTRATE 5 MG: 5 INJECTION INTRAVENOUS at 01:07

## 2017-01-01 RX ADMIN — ATROPINE SULFATE 2 DROP: 10 SOLUTION/ DROPS OPHTHALMIC at 12:19

## 2017-01-01 RX ADMIN — IOPAMIDOL 50 ML: 755 INJECTION, SOLUTION INTRAVENOUS at 14:35

## 2017-01-01 RX ADMIN — ATROPINE SULFATE 2 DROP: 10 SOLUTION/ DROPS OPHTHALMIC at 15:11

## 2017-01-01 RX ADMIN — METOPROLOL TARTRATE 5 MG: 5 INJECTION INTRAVENOUS at 01:41

## 2017-01-01 ASSESSMENT — ENCOUNTER SYMPTOMS
SHORTNESS OF BREATH: 1
COUGH: 1
DIFFICULTY URINATING: 1

## 2017-02-06 PROBLEM — B02.9 HERPES ZOSTER: Status: ACTIVE | Noted: 2017-01-01

## 2017-02-06 NOTE — PROGRESS NOTES
MD paged for clarification RE: Coumadin order. Per Dr Parham pt on anticoagulation for A-fib, INR currently therapeutic. Coumadin on hold for potential LP. Pharmacist updated.

## 2017-02-06 NOTE — ED NOTES
Called wife who reports pt has been weak for past 3 days- not walking around the house. Pt confused tonight because he couldn't understand how to use the toilet at the bedside at 1800. Pt walks with walker. Report given to JACINTA Drew.

## 2017-02-06 NOTE — PLAN OF CARE
Problem: Goal Outcome Summary  Goal: Goal Outcome Summary  Outcome: No Change  Pt up from ED @approx. 0250, alert, disoriented to time, situation, place; impulsive at times, mitts in place. VSS on RA except hypertension. MD aware, see orders for PRN Hydralazine. Pt is [place], no hearing aids at bedside. Contact precautions for herpes zoster, rash noted R sternum to R scapula, large bullae, dressing placed for protection. Wound nurse to see. BS Hypo, small amount yellow emesis x1, PRN Zofran with effect. Pt reports not having BM x 1 week, is unreliable historian, PRN senna given. IVF infusing. Had received Acyclovir in ED. Infectious Disease to consult. Rodriguez patent and draining for retention. Nursing to continue to monitor.

## 2017-02-06 NOTE — PLAN OF CARE
Problem: Goal Outcome Summary  Goal: Goal Outcome Summary  Outcome: No Change  Pt is alert and oriented to self only. IV infusing @100. Assistance of one. Rodriguez in. Emesis x1, zofran was given. Tolerated lunch ok. VSS, except BP has been elevated. Lesions on his back and chest, some open, some are still blisters. Pt gets anxious at times, reorienting the pt.

## 2017-02-06 NOTE — PLAN OF CARE
Problem: Goal Outcome Summary  Goal: Goal Outcome Summary  OT and PT: Patient just admitted and will potentially have a lumbar puncture today. Holding therapy evals to allow for medical progress and treatment.

## 2017-02-06 NOTE — CONSULTS
[facility]    Infectious Disease Consultation     Date of Admission:  2/5/2017  Date of Consult (When I saw the patient): 02/06/2017    Assessment and Plan  Jose Manuel Caceres is a 88 year old male who was admitted on 2/5/2017. I was asked to see the patient for herpes zoster.    Impression:   88 y.o male with CKD.   Admitted with shingles on the right thorax and weakness.   Very very hard of hearing even with hearing aids.   On IV acyclovir.     Recommendations:   Continue on IV acyclovir, careful eye on kidney function given CKD.   IVF.     Micaela Ashby MD    Reason for Consult  Reason for consult: I was asked by Dr. Parham to evaluate this patient for above mentioned.    Primary Care Physician  Jason Phillip    Chief Complaint  Rash on the chest and back     History is obtained from the patient and medical records    History of Present Illness  Jose Manuel Caceres is a 88 year old male with kidney disease Stage 3, dyslipidemia, abdominal aortic aneurysm repair, osteoporosis and hypertension with other diagnoses.  The patient is very hard of hearing.  He reports     he was brought in by EMS because he had been feeling weak for a number of days, was unable to get out of bed and in fact has been bed-bound.  In the Emergency Department he was noted to have shingles on the right thorax.      Past Medical History  I have reviewed this patient's medical history and updated it with pertinent information if needed.   Past Medical History   Diagnosis Date     Cancer (H) skin     Hyperlipidaemia      PAD (peripheral artery disease) (H)      Hypertension        Past Surgical History  I have reviewed this patient's surgical history and updated it with pertinent information if needed.  Past Surgical History   Procedure Laterality Date     Vascular surgery  AAA     Abdomen surgery  Exploatory lap     Biopsy  skin cancer forehead     Colonoscopy         Prior to Admission Medications  Prior to Admission Medications    Prescriptions Last Dose Informant Patient Reported? Taking?   ALLOPURINOL PO  Spouse/Significant Other Yes No   Sig: Take 300 mg by mouth daily   Cholecalciferol (VITAMIN D PO)  Spouse/Significant Other Yes No   Sig: Take 1,000 Units by mouth daily    METOPROLOL TARTRATE PO  Spouse/Significant Other Yes No   Sig: Take 25 mg by mouth 2 times daily   Multiple Vitamins-Minerals (OCUVITE PO)  Spouse/Significant Other Yes No   Sig: Take 1 capsule by mouth daily    TRAMADOL HCL PO  Spouse/Significant Other Yes No   Sig: Take 50 mg by mouth daily    atorvastatin (LIPITOR) 10 MG tablet  Spouse/Significant Other Yes No   Sig: Take 10 mg by mouth daily      Facility-Administered Medications: None     Allergies  No Known Allergies    Immunization History    There is no immunization history on file for this patient.    Social History  I have reviewed this patient's social history and updated it with pertinent information if needed. Jose Manuel Caceres  reports that he quit smoking about 29 years ago. His smoking use included Cigarettes. He has a 37.5 pack-year smoking history. He has never used smokeless tobacco. He reports that he drinks alcohol. He reports that he does not use illicit drugs.    Family History  I have reviewed this patient's family history and updated it with pertinent information if needed.   Family History   Problem Relation Age of Onset     Alzheimer Disease Father      HEART DISEASE Brother      HEART DISEASE Sister        Review of Systems  The 10 point Review of Systems is negative other than noted in the HPI or here.     Physical Exam  Temp: 98.4  F (36.9  C) Temp src: Oral BP: (!) 169/106 mmHg Pulse: 75 Heart Rate: 93 Resp: 17 SpO2: 97 % O2 Device: None (Room air)    Vital Signs with Ranges  Temp:  [98.2  F (36.8  C)-98.4  F (36.9  C)] 98.4  F (36.9  C)  Pulse:  [75] 75  Heart Rate:  [85-96] 93  Resp:  [0-17] 17  BP: (156-186)/(101-129) 169/106 mmHg  SpO2:  [94 %-97 %] 97 %  151 lbs 14.35 oz    GENERAL  APPEARANCE: elderly male, sitting on the chair   EYES: Eyes grossly normal to inspection, PERRL and conjunctivae and sclerae normal  HENT: ear canals and TM's normal and nose and mouth without ulcers or lesions  NECK: no adenopathy, no asymmetry, masses, or scars and thyroid normal to palpation  RESP: lungs clear to auscultation - no rales, rhonchi or wheezes  CV: regular rates and rhythm, normal S1 S2, no S3 or S4 and no murmur, click or rub  LYMPHATICS: normal ant/post cervical and supraclavicular nodes  ABDOMEN: soft, nontender, without hepatosplenomegaly or masses and bowel sounds normal  MS: extremities normal- no gross deformities noted  SKIN: rash on the chest and back huge blisters   NEURO: Normal strength and tone, mentation intact and speech normal  PSYCH: mentation appears normal and affect normal/bright    Data  Lab Results   Component Value Date    WBC 3.8* 02/05/2017    HGB 12.6* 02/05/2017    HCT 38.0* 02/05/2017    PLT 99* 02/05/2017     02/05/2017    POTASSIUM 4.8 02/05/2017    CHLORIDE 102 02/05/2017    CO2 27 02/05/2017    BUN 32* 02/05/2017    CR 1.79* 02/05/2017    * 02/05/2017    TROPI  02/05/2017     <0.015  The 99th percentile for upper reference range is 0.045 ug/L.  Troponin values in   the range of 0.045 - 0.120 ug/L may be associated with risks of adverse   clinical events.      AST 28 02/05/2017    ALT 15 02/05/2017    ALKPHOS 85 02/05/2017    BILITOTAL 0.7 02/05/2017    INR 1.81* 02/05/2017       Recent Labs  Lab 02/05/17  2330 02/05/17  2325   CULT No growth after 7 hours No growth after 7 hours     Recent Labs   Lab Test  02/05/17   2330  02/05/17   2325   CULT  No growth after 7 hours  No growth after 7 hours

## 2017-02-06 NOTE — PHARMACY-ADMISSION MEDICATION HISTORY
Admission medication history interview status for the 2/5/2017  admission is complete. See EPIC admission navigator for prior to admission medications     Medication history source reliability:Good    Actions taken by pharmacist (provider contacted, etc): spoke with wife     Additional medication history information not noted on PTA med list :None    Medication reconciliation/reorder completed by provider prior to medication history? Yes    Time spent in this activity: 15 minutes    Prior to Admission medications    Medication Sig Last Dose Taking? Auth Provider   WARFARIN SODIUM PO Take 5 mg by mouth every 7 days Wednesdays 2/1/2017 Yes Unknown, Entered By History   WARFARIN SODIUM PO Take 2.5 mg by mouth See Admin Instructions 2.5 mg Mon, Tues, Thurs, Fri, Sat, Sun 2/4/2017 Yes Unknown, Entered By History   tetrahydrozoline 0.05 % ophthalmic solution as needed  Yes Unknown, Entered By History   METOPROLOL TARTRATE PO Take 25 mg by mouth 2 times daily 2/5/2017 at am Yes Unknown, Entered By History   TRAMADOL HCL PO Take 50 mg by mouth 3 times daily as needed  2/5/2017 at Unknown time Yes Reported, Patient   Multiple Vitamins-Minerals (OCUVITE PO) Take 1 capsule by mouth daily  2/5/2017 at Unknown time Yes Reported, Patient   ALLOPURINOL PO Take 300 mg by mouth daily 2/5/2017 at Unknown time Yes Reported, Patient   Cholecalciferol (VITAMIN D PO) Take 1,000 Units by mouth daily  2/5/2017 at Unknown time Yes Reported, Patient   atorvastatin (LIPITOR) 10 MG tablet Take 10 mg by mouth daily 2/5/2017 at Unknown time Yes Reported, Patient   Sharon Moon, PaulineD

## 2017-02-06 NOTE — ED NOTES
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   Altered mental status, unspecified altered mental status type   Generalized muscle weakness       Code Status: Full Code    Allergies: No Known Allergies    Activity level:  Stand with Assist     Needed?: No    Isolation: Yes  Infection: Not Applicable  Other has SHINGLES    Bariatric?: No      Vital Signs:   Filed Vitals:    02/05/17 2148 02/05/17 2230 02/05/17 2330 02/06/17 0000   BP: 186/106 169/107 170/109 171/101   Temp: 98.2  F (36.8  C)      TempSrc: Oral      Resp: 14 0 16 16   Height: 1.753 m (5' 9\")      Weight: 77.111 kg (170 lb)      SpO2: 94% 96% 97% 97%       Cardiac Rhythm: ,        Pain level:      Is this patient confused?: Yes    Patient Report: Initial Complaint: Jose Manuel Caceres is a 88 year old male who presents to the emergency department today via EMS for evaluation of generalized weakness and urinary retention. The patient states that he has been having diffuse chest pain for several weeks. He reports that he has a rash across his left chest which he reports began awhile ago. The patient also complains of cough and shortness of breath that presents with cough. He also reports difficulty urinating. Pt has shingles, is confused and hard of hearing. Hearing aides are at home. Pt uses chair lift to get up then walks with walker  Focused Assessment: weakness, confusion  Tests Performed: see epic  Abnormal Results: see epic  Treatments provided: see [facility]    Family Comments: wife at home, [phone]    OBS brochure/video discussed/provided to patient: No    ED Medications:   Medications   0.9% sodium chloride BOLUS (0 mLs Intravenous Stopped 2/5/17 2484)     Followed by   0.9% sodium chloride infusion (not administered)   acyclovir (ZOVIRAX) 800 mg in D5W 250 mL intermittent infusion (not " administered)       Drips infusing?:  No      ED NURSE PHONE NUMBER: [phone]

## 2017-02-06 NOTE — PROGRESS NOTES
[facility] Nurse Inpatient Wound Assessment     Initial Assessment of wound(s) on pt's:   Right chest, side, back - shingles lesions/blisters        Data:   Patient History:      per MD note(s): The patient Jose Manuel Caceres is a frail-appearing, 88-year-old  man who is followed by Dr. Jason Phillip.  Jose Manuel has a history including atrial fibrillation for which he is on anticoagulation, kidney disease Stage 3, dyslipidemia, abdominal aortic aneurysm repair, osteoporosis and hypertension with other diagnoses.  The patient is very hard of hearing.  He reports     he was brought in by EMS because he had been feeling weak for a number of days, was unable to get out of bed and in fact has been bed-bound.  In the Emergency Department he was noted to have shingles on the right thorax.  His wife thought the patient was quite confused today.           Current Diet / Nutrition:       Active Diet Order  Combination Diet Regular Diet Adult             Cosat Assessment and sub scores:   Costa Score  Av  Min: 17  Max: 17       Labs:      Recent Labs   Lab Test  17   2156   ALBUMIN  3.4   HGB  12.6*   RBC  3.87*   WBC  3.8*   PLT  99*   INR  1.81*          Wound Assessment (location #1):   Right chest/side/back  Wound History:  Severe shingles outbreak    Numerous small to large intact serous blisters noted along all areas.  Largest and most painful blisters are on front of right chest, and are up to approx. 8cm diameter, raised 2-3cm.      Surrounding tissue is reddened and bumpy/blistery.  Only one small open area noted so far, on upper mid back, moist red dermis, scant bloody drainage.     Very painful for pt, especially on chest.           Intervention:     Patient's chart evaluated.      Wound(s) was assessed    Wound Care: recovered areas with the ABDs     Orders  Written    Supplies  Reviewed    Discussed plan of care with Patient and Nurse          Assessment:       Severe shingles  blisters/lesions to right chest, side, upper back.  Numerous small to large serous blisters, which are basically still intact.  Agree with covering lightly for protection; if they rupture would just add adaptic and continue ABDs.          Plan:     Nursing to notify the Provider(s) and re-consult the [facility] Nurse if wound(s) deteriorate(s) or if the wound care plan needs reevaluation.      Plan of care for wounds located on right chest/side/back: Daily/prn:  1.  Swab periwounds and anywhere tape will go with No-sting skin barrier.  Can also swab over intact blisters prn to help fortify the epidermis.   2.  Cover blistered areas/wounds lightly with ABDs and Medipore tape, or can try securing ABDs with mesh panties (cut out crotch) if not too painful.    3.  If blisters open/rupture, cleanse open areas by dabbing with gauze that has been moistened with wound cleanser, cover with adaptic, and then apply the ABDs.  Label any dressing changes with time/date/initials.     WO Nurse will return: weekly and prn     Face to face time: 15 Minutes

## 2017-02-06 NOTE — ED NOTES
Unable to cath pt. Foreskin will not move back. Pt c/o of pain. MD aware. Breathing easy, nonlabored. Skin w/d.

## 2017-02-06 NOTE — ED NOTES
Called pt wife Quincy to update on plan of care, explained the need to do and LP and she has given consent to have it done

## 2017-02-06 NOTE — ED NOTES
Bed: ED03  Expected date: 2/5/17  Expected time: 9:28 PM  Means of arrival: Ambulance  Comments:  [id number] Failure to Thrive

## 2017-02-06 NOTE — ED PROVIDER NOTES
"  History     Chief Complaint:  Weakness     History limited secondary to patient's difficulty hearing and slight confusion.   HPI   Jose Manuel Caceres is a 88 year old male who presents to the emergency department today via EMS for evaluation of weakness. The patient states that he has been having diffuse chest pain for several weeks. Upon evaluation, he reports that he did not notice the rash across his chest and back, so he is unsure how long he has had it. The patient also complains of difficulty urinating, a cough and shortness of breath that is present only with his cough. He denies headaches or fevers, but he admits he is probably confused. His wife indicates that he hasn't been able to get out of bed for the past 3 days.    Allergies:  No Known Drug Allergies       Medications:    Metopolol  Tramadol  Ocuvite  Lipitor     Past Medical History:    Cancer  Hyperlipidemia   PAD  HTN     Past Surgical History:    Vascular surgery  Abdomen surgery  Biopsy  Colonoscopy       Family History:    Father: Alzheimer's disease  Brother: Heart disease  Sister: Heart disease     Social History:  The patient was accompanied to the ED by EMS.  Smoking Status: Former smoker  Smokeless Tobacco: Never used  Alcohol Use: Yes  Marital Status:   [2]       Review of Systems   Respiratory: Positive for cough and shortness of breath.    Genitourinary: Positive for difficulty urinating.   Skin: Positive for rash.   All other systems reviewed and are negative.    Physical Exam   Vitals  Patient Vitals for the past 24 hrs:   BP Temp Temp src Heart Rate Resp SpO2 Height Weight   02/05/17 2148 (!) 186/106 mmHg 98.2  F (36.8  C) Oral 87 14 94 % 1.753 m (5' 9\") 77.111 kg (170 lb)        Physical Exam  Nursing note and vitals reviewed.  Constitutional:  Awake, extremely hard of hearing.  HENT:   Nose:    Nose normal.   Mouth/Throat:   Mucous membranes are normal.   Eyes:    Conjunctivae normal and EOM are normal.      Pupils are equal, " round, and reactive to light.   Neck:    Trachea normal.   Cardiovascular:  Irregular irregular rhythm, normal heart sounds and normal pulses. No murmur heard.  Pulmonary/Chest:  Effort normal and breath sounds normal.   Abdominal:   Soft. Normal appearance and bowel sounds are normal.      There is no tenderness.      There is no rebound and no CVA tenderness.   Musculoskeletal:  Extremities atraumatic x 4.   Lymphadenopathy:  No cervical adenopathy.   Neurological:   Alert and oriented to person, place, and time. Normal strength.      No cranial nerve deficit or sensory deficit. GCS eye subscore is 4. GCS verbal subscore is 5. GCS motor subscore is 6.   Skin:    Skin is intact. Herpetic rash with large bullae along the right lower chest and back.  Psychiatric:   Slightly confused    Emergency Department Course     ECG:  ECG taken at 2209, ECG read at 2211  Atrial fibrillation with premature ventricular or aberrantly conducted complexes  Septal infarct (cited on or before 31-AUG-2006)  Abnormal ECG  Rate 90 bpm. DE interval *. QRS duration 86. QT/QTc 372/455. P-R-T axes * -28 60.        Laboratory:  Laboratory findings were communicated with the patient who voiced understanding of the findings.    UA reflex to Microscopic: Urine bloo: Trace(A), Protein albumin Urine: 30(A), RBC/HPF: 13(H)    Influenza A/B antigen: Negative     Blood culture: Pending    Blood culture: Pending    ISTAT gases lacate demetra POCT: Ph venous: 7.38, PCO2 Venous: 47, PO2 Venous: 39, Bicarbonate venous: 28, O2 Sat venous: 72    CBC: WBC: 3.8(L), HGB: 12.6(L), PLT: 99(L)      CMP: Creatinine: 1.79(H), Glucose: 115(H), BUN: 32(H), GFR: 36(L)    Troponin (Collected 2156): <0.015    INR: 1.18(H)    Interventions:  2201- NS 1000 mL IV  0111- Zovirax 800 mg IV      Emergency Department Course:  Nursing notes and vitals reviewed.  I performed an exam of the patient as documented above.      IV was inserted and blood was drawn for laboratory testing,  results above.    The patient provided a urine sample here in the emergency department. This was sent for laboratory testing, findings above.    I discussed the treatment plan with the patient. They expressed understanding of this plan and consented to admission. I discussed the patient with Dr. Parham, who will admit the patient to a monitored bed for further evaluation and treatment.      I personally reviewed the laboratory results with the Patient and answered all related questions prior to admission.   Impression & Plan      Medical Decision Making:  This is an 88 year old male who was brought in by ambulance for further evaluation of generalized weakness for the last few days as well as confusion. Unfortunately, he is extremely hard of hearing, making his evaluation much more difficult. We spoke with his wife on a couple of occasions, and she indicated that he is unable to get out of bed now. I proceeded with the above workup, and I had wanted to perform a lumbar puncture due to my concern that he could have herpes encephalitis. Unfortunately, it was unclear if he was still on Coumadin, and it took quite some time to obtain his INR. It appears that he is on Coumadin though, and therefore I am not comfortable performing an LP with his INR where it is. I think we should treat him empirically with IV Acyclovir for possibly herpes encephalitis, as he has a very extensive herpes zoster. I subsequently spoke with Dr. Parham who will be admitting him.           Diagnosis:    ICD-10-CM    1. 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 Altered mental status, unspecified altered mental status type R41.82    3. Generalized muscle weakness M62.81          Disposition:   The patient was admitted.     Scribe Disclosure:  I, Akhil Alvarado, am serving as a scribe at 9:57 PM on 2/5/2017 to document services personally performed by Reese Wilson MD, based on my observations and the provider's statements  to me.    2/5/2017    EMERGENCY DEPARTMENT        Reese Wilson MD  02/06/17 0433

## 2017-02-06 NOTE — H&P
CHIEF COMPLAINT:  Weakness.      HISTORY OF PRESENT ILLNESS:  The patient Jose Manuel Caceres is a frail-appearing, 88-year-old  man who is followed by Dr. Jason Phillip.  Jose Manuel has a history including atrial fibrillation for which he is on anticoagulation, kidney disease Stage 3, dyslipidemia, abdominal aortic aneurysm repair, osteoporosis and hypertension with other diagnoses.  The patient is very hard of hearing.  He reports    he was brought in by EMS because he had been feeling weak for a number of days, was unable to get out of bed and in fact has been bed-bound.  In the Emergency Department he was noted to have shingles on the right thorax.  His wife thought the patient was quite confused today.  She was unable to come to the hospital, but I discussed with her by phone that the patient is not at his baseline.      In the Emergency Department the patient's studies included a sodium of 137, potassium 4.8, BUN 32, creatinine 1.79, glucose 115, otherwise unremarkable.  Troponin was negative.  CBC showed    a white count of 3.8, hemoglobin 12.6, platelets of 99,000.  INR was 1.81.  Blood cultures were obtained and are pending.  Lactic acid was 1.8.  Venous blood gases included a pH of 7.38, pCO2 47, pO2 39 and bicarbonate of 28.  Influenza A and B were negative.  Urinalysis demonstrated 0 WBC/hpf and 13 RBC/hpf.  The patient did receive IV acyclovir in the Emergency Department, and due to his confusion plans were made for the patient to undergo a lumbar puncture.  However, given his INR    of 1.81 this was deferred.  The patient is being admitted for his severe herpes zoster outbreak and confusion.      PAST MEDICAL HISTORY:   1.  Atrial fibrillation on anticoagulation.   2.  Chronic kidney disease, Stage 3.   3.  Osteoporosis.   4.  Hypertension.   5.  Chronic gastritis.      PAST SURGICAL HISTORY:    1.  Vascular surgery.   2.  Biopsy and colonoscopy.   3.  Abdominal aortic aneurysm repair.      FAMILY  HISTORY:  Father with Alzheimer dementia, brother and sister with coronary artery disease.      SOCIAL HISTORY:  The patient is  and lives with his wife in their house.  He stopped smoking tobacco greater than 30 years ago and drinks alcohol rarely.        ALLERGIES:  No known drug allergies.      MEDICATIONS:   1.  Allopurinol 300 mg once a day.   2.  Lipitor 10 mg a day.   3.  Vitamin E 1,000 units once a day.   4.  Metoprolol 25 mg b.i.d.   5.  Ocuvite 1 tablet.   6.  Tramadol 50 mg daily.      REVIEW OF SYSTEMS:  A 10-point review was performed.  The patient denies cardiopulmonary complaints, cough or fever.  He does have some chest pain that he states has been going on for several  weeks.  He denies confusion, headache or vision changes.  He has some difficulty passing his urine.  Otherwise the 10-point review is negative except as above.      PHYSICAL EXAMINATION:   VITAL SIGNS:  Temperature 98.0, heart rate 93, respirations 12, blood pressure 156/103,   saturations 96% on room air.   GENERAL:  The patient is a frail 88-year-old man who is hard of hearing, but cooperative.   HEENT:  His eyes appear somewhat mattery.  Mucous membranes are dry and parched.   NECK:  Veins are flat.   RESPIRATORY:  Lungs clear to auscultation.   CARDIOVASCULAR:  S1, S2 irregularly irregular.   CHEST/BACK:  Examination shows a large outbreak of zoster over the right T4 dermatomal distribution that goes all the way up into his back.  There are large bullae on the anterior chest wall and several denuded blisters on his back.   ABDOMEN is soft and nontender with normoactive bowel sounds and a midline surgical scar.   EXTREMITIES:  No edema.   NEUROLOGIC:  He is very weak and requires assistance to sit up.        LABORATORY DATA/IMAGING:  As above in the History of Present Illness.      ASSESSMENT:  The patient is an 88-year-old man who is admitted for further treatment with weakness, chest pain, a herpes zoster outbreak and  mild confusion.      PLAN:   1.  Herpes zoster reactivation.  The patient will be treated with IV Acyclovir, and we will obtain    a formal Infectious Disease consultation.  It is unclear whether the patient truly is encephalopathic.     He appears to be quite dehydrated and does seem to be improving clinically.  In this context I am not sure if a CT scan would change much.  For now we will continue IV Acyclovir and supportive therapy with IV fluids.  We will hopefully expect improvement with this treatment with respect to his mentation.  We will defer to Infectious Disease with respect to further imaging studies.  Meanwhile we will hold on Coumadin dosing until it is assured that the patient does not need invasive testing.   2.  Hypertension.  We will continue the patient on his metoprolol.  I will add p.r.n. medications as he is quite hypertensive.   3.  Hyperlipidemia.  We will continue the patient on Lipitor.   4.  History of gout.  We will continue the patient on allopurinol.   5.  Deconditioning.  The patient will be seen by Physical Therapy and Occupational Therapy and may need a transitional care stay.   6.  Full code status..         FRANCISCO MORENO MD             D: 2017 05:04   T: 2017 07:12   MT: [id number]      Name:     JUAN QUESADA   MRN:      [id number]        Account:      [id number]   :      1928           Admitted:     [id number]      Document: [id number]       cc: Jason Phillip MD

## 2017-02-07 NOTE — PLAN OF CARE
Problem: Goal Outcome Summary  Goal: Goal Outcome Summary  Outcome: No Change  Pt somnolent, HIEU orientation.  Tachycardic, all other VSS on RA. Restraints continued d/t confusion, pulling on lines.  Dressing CDI.  MRI ordered, unable to perform d/t restlessness.  Did not eat d/t LOC.  Rodriguez patent, draining straw-yellow urine.  Bedrest.

## 2017-02-07 NOTE — PROVIDER NOTIFICATION
Pt speech slurred, mentation decreased from yesterday. HIEU neuros d/t pt inability to follow commands.  MD notified. Oral meds held until pt alert enough to swallow.

## 2017-02-07 NOTE — PLAN OF CARE
Problem: Goal Outcome Summary  Goal: Goal Outcome Summary  Outcome: No Change  Pt disoriented/confused, restless, and agitated. Restraints on all 4 extremities; attempted alternatives, pt tried to pull lines/catheter. Performed ROM and repositioned. /118, hydralazine x1, recheck 160/103. RA. Tachy. Urinated per higgins. Shingles blisters on R side; dressing changed. ID following. D/c pending.

## 2017-02-07 NOTE — PROGRESS NOTES
[facility]    Infectious Disease Progress Note    Date of Service (when I saw the patient): 02/07/2017     Assessment and Plan  Jose Manuel Caceres is a 88 year old male who was admitted on 2/5/2017.     Impression:    88 y.o male with CKD.    Admitted with shingles on the right thorax and weakness, confused and agitated overnight.   Today on exam very different then where he was yesterday for example was sitting up and taking to me when compared to today, he is lethargic, confused, garbled speech.   Did get zyprexa last night, ? Cause, vs other.   Very very hard of hearing even with hearing aids.    On IV acyclovir.     Recommendations:    Continue on IV acyclovir, careful eye on kidney function given CKD.    IVF.   ? MRI    Micaela Ashby MD    Interval History   Afebrile   More confused, garbled speech completely out of it     Physical Exam  Temp: 98.5  F (36.9  C) Temp src: Axillary (Left) BP: 139/84 mmHg Pulse: 114 Heart Rate: 110 Resp: 17 SpO2: 93 % O2 Device: None (Room air)    Filed Vitals:    02/05/17 2148 02/06/17 0627 02/07/17 0500   Weight: 77.111 kg (170 lb) 68.9 kg (151 lb 14.4 oz) 66.2 kg (145 lb 15.1 oz)     Vital Signs with Ranges  Temp:  [97.6  F (36.4  C)-98.6  F (37  C)] 98.5  F (36.9  C)  Pulse:  [] 114  Heart Rate:  [110] 110  Resp:  [14-17] 17  BP: (139-178)/() 139/84 mmHg  SpO2:  [93 %-96 %] 93 %    Constitutional: completely different on exam today when compared to yesterday. Completely out of it moaning, speech is garbled   Lungs: Clear to auscultation bilaterally, no crackles or wheezing  Cardiovascular: Regular rate and rhythm, normal S1 and S2, and no murmur noted  Abdomen: Normal bowel sounds, soft, non-distended, non-tender  Skin: rash on the chest and back with large weeping blister   Other:    Medications    NaCl 100 mL/hr at 02/07/17 0647     Warfarin Therapy Reminder         allopurinol (ZYLOPRIM) tablet 300 mg  300 mg Oral Daily     atorvastatin  10 mg Oral  Daily     cholecalciferol  1,000 Units Oral Daily     metoprolol (LOPRESSOR) tablet 25 mg  25 mg Oral BID     multivitamin  with lutein  1 capsule Oral Daily     senna-docusate  1-2 tablet Oral BID     acyclovir (ZOVIRAX) IV  10 mg/kg Intravenous Q12H       Data  All microbiology laboratory data reviewed.  Recent Labs   Lab Test  02/07/17 0828  02/05/17   2156  10/08/16   0923   WBC  5.5  3.8*  4.2   HGB  11.3*  12.6*  10.5*   HCT  33.6*  38.0*  32.0*   MCV  95  98  96   PLT  86*  99*  103*     Recent Labs   Lab Test  02/07/17   0828  02/05/17   2156  10/06/16   1954   CR  1.73*  1.79*  2.19*     No lab results found.  Recent Labs   Lab Test  02/05/17   2330  02/05/17   2325   CULT  No growth after 19 hours  No growth after 19 hours

## 2017-02-07 NOTE — PROGRESS NOTES
"[facility]  Hospitalist Progress Note  Robb Parham MD  02/07/2017    Assessment and Plan  ASSESSMENT:  The patient is an 88-year-old man who is admitted for further treatment with weakness, chest pain, a herpes zoster outbreak and mild confusion.        PLAN:    1.  Herpes zoster     - continue with IV Acyclovir.   - wound care instructions.  2. Acute metabolic encephalopathy.  - suspected delirium over herpes encephalopathy.  - MRI ordered but patient too agitated to undergo this or LP.  - continue supportive treatment.  - scheduled and prn seroquel.     3.  Atrial Fibrillation.  - INR therapeutic  - holding today's coumadin dose, in event patient needs LP.  - HR controlled.  4. Hypertension.    - continue the patient on his metoprolol.    - p.r.n. Hydralazine  - blood pressure under better control.   5.  Hyperlipidemia.    - continue the patient on Lipitor.    6.  History of gout.    - continue the patient on allopurinol.    7.  Deconditioning.    - Physical Therapy and Occupational Therapy   - will need transitional care stay.    8.  Full code status..          Interval History  - slept little and agitated overnight and this am  - discussed with ID    -Data reviewed today: I reviewed all new labs and imaging over the last 24 hours. I personally reviewed no images or EKG's today.    Physical Exam  Heart Rate: 110, Blood pressure 139/84, pulse 114, temperature 98.5  F (36.9  C), temperature source Axillary, resp. rate 17, height 1.753 m (5' 9\"), weight 66.2 kg (145 lb 15.1 oz), SpO2 93 %.  Filed Vitals:    02/05/17 2148 02/06/17 0627 02/07/17 0500   Weight: 77.111 kg (170 lb) 68.9 kg (151 lb 14.4 oz) 66.2 kg (145 lb 15.1 oz)     Vital Signs with Ranges  Temp:  [97.6  F (36.4  C)-98.6  F (37  C)] 98.5  F (36.9  C)  Pulse:  [] 114  Heart Rate:  [110] 110  Resp:  [14-17] 17  BP: (139-178)/() 139/84 mmHg  SpO2:  [93 %-96 %] 93 %  I/O's Last 24 hours  I/O last 3 completed shifts:  In: " 1893.33 [P.O.:340; I.V.:1553.33]  Out: 850 [Urine:750; Emesis/NG output:100]    Constitutional: agitated  Respiratory: Clear to auscultation bilaterally, no crackles or wheezing  Cardiovascular: Regular rate and rhythm, normal S1 and S2, and no murmur noted  GI: Normal bowel sounds, soft, non-distended, non-tender  Skin/Integumen: No rashes, no cyanosis, no edema  Other:      Medications  All medications were reviewed.    NaCl 100 mL/hr at 02/07/17 0647       QUEtiapine  25 mg Oral At Bedtime     allopurinol (ZYLOPRIM) tablet 300 mg  300 mg Oral Daily     atorvastatin  10 mg Oral Daily     cholecalciferol  1,000 Units Oral Daily     metoprolol (LOPRESSOR) tablet 25 mg  25 mg Oral BID     multivitamin  with lutein  1 capsule Oral Daily     senna-docusate  1-2 tablet Oral BID     acyclovir (ZOVIRAX) IV  10 mg/kg Intravenous Q12H        Data    Recent Labs  Lab 02/07/17  0828 02/05/17  2156   WBC 5.5 3.8*   HGB 11.3* 12.6*   MCV 95 98   PLT 86* 99*   INR 2.04* 1.81*    137   POTASSIUM 4.5 4.8   CHLORIDE 102 102   CO2 24 27   BUN 33* 32*   CR 1.73* 1.79*   ANIONGAP 8 8   DIANE 8.1* 8.5   * 115*   ALBUMIN  --  3.4   PROTTOTAL  --  6.8   BILITOTAL  --  0.7   ALKPHOS  --  85   ALT  --  15   AST  --  28   TROPI  --  <0.015The 99th percentile for upper reference range is 0.045 ug/L.  Troponin values in the range of 0.045 - 0.120 ug/L may be associated with risks of adverse clinical events.       No results found for this or any previous visit (from the past 24 hour(s)).    Robb Parham MD  Pager [phone]

## 2017-02-07 NOTE — PLAN OF CARE
Problem: Goal Outcome Summary  Goal: Goal Outcome Summary  confused, restless and agitated, attempted to get out of the bed and pull the IV, started restraints, given zyprexa x 1, vss, / 96, on RA, denied pain, higgins patent, shingles blisters on right back, side and chest, changed dressing, folley patent, appetite fair, IVF at 100cc/ hr, ID following, d/c pending progress

## 2017-02-07 NOTE — PLAN OF CARE
Problem: Goal Outcome Summary  Goal: Goal Outcome Summary  OT/PT:Orders received, chart reviewed. Pt. lethargic, disoriented, confused, agitated overnight. Will attempt later today(per nursing request) as schedule permits, otherwise 2/8.

## 2017-02-08 NOTE — PROGRESS NOTES
"[facility]  Hospitalist Progress Note  Robb Parham MD  02/08/2017    Assessment and Plan  ASSESSMENT:  The patient is an 88-year-old man who is admitted for further treatment with weakness, chest pain, a herpes zoster outbreak and mild confusion.        PLAN:    1.  Herpes zoster     - continue with IV Acyclovir.   - wound care dressings ordered for large area of invovlement.  2. Acute metabolic encephalopathy.  - suspected delirium rather than herpes encephalopathy.  - MRI ordered but patient too agitated to undergo as well as LP  - will check Head CT  - psychiatry to help, he is NPO  - unable to take po, will order iv haldol.     3.  Atrial Fibrillation.  - INR sub-therapeutic at 1.3  - await head CT, if ok then proceed with iv heparin without boluses due to low platelets 80K  - HR controlled.  4. Hypertension.    - continue the patient on iv metoprolol.    - p.r.n. Hydralazine  5.  Hyperlipidemia.    - hold Lipitor due to npo status.    6.  History of gout.    - hold on allopurinol due to npo status  7. Nutrition.  - patient has been NPO  - change IVF to D51/2NS with KCl today  - if patient still confused tomorrow, then would advocate temporary NGFT, discussed with wife about this.  8. Deconditioning.    - Physical Therapy and Occupational Therapy   - will need transitional care stay.    9.  Full code status.  10. Disposition  - > 2 days before discharge        Interval History  - continues to be agitated  - on restraints  - updated wife    -Data reviewed today: I reviewed all new labs and imaging over the last 24 hours. I personally reviewed no images or EKG's today.    Physical Exam  Heart Rate: 100, Blood pressure 132/87, pulse 117, temperature 99.1  F (37.3  C), temperature source Oral, resp. rate 19, height 1.753 m (5' 9\"), weight 67.8 kg (149 lb 7.6 oz), SpO2 95 %.  Filed Vitals:    02/06/17 0627 02/07/17 0500 02/08/17 0720   Weight: 68.9 kg (151 lb 14.4 oz) 66.2 kg (145 lb 15.1 oz) " 67.8 kg (149 lb 7.6 oz)     Vital Signs with Ranges  Temp:  [97.9  F (36.6  C)-99.1  F (37.3  C)] 99.1  F (37.3  C)  Pulse:  [117-120] 117  Heart Rate:  [100] 100  Resp:  [16-20] 19  BP: (132-149)/(87-95) 132/87 mmHg  SpO2:  [94 %-95 %] 95 %  I/O's Last 24 hours  I/O last 3 completed shifts:  In: 1726 [I.V.:1726]  Out: 625 [Urine:625]    Constitutional: agitated  Respiratory: Clear to auscultation bilaterally, no crackles or wheezing  Cardiovascular: tachycardic, normal S1 and S2, and no murmur noted  GI: Normal bowel sounds, soft, non-distended, non-tender  Skin/Integumen: chest zoster rash, ecchymosis  Other:      Medications  All medications were reviewed.    dextrose 5% and 0.45% NaCl + KCl 20 mEq/L         QUEtiapine  25 mg Oral At Bedtime     allopurinol (ZYLOPRIM) tablet 300 mg  300 mg Oral Daily     atorvastatin  10 mg Oral Daily     cholecalciferol  1,000 Units Oral Daily     metoprolol (LOPRESSOR) tablet 25 mg  25 mg Oral BID     multivitamin  with lutein  1 capsule Oral Daily     senna-docusate  1-2 tablet Oral BID     acyclovir (ZOVIRAX) IV  10 mg/kg Intravenous Q12H        Data    Recent Labs  Lab 02/08/17  0835 02/07/17  0828 02/05/17  2156   WBC  --  5.5 3.8*   HGB  --  11.3* 12.6*   MCV  --  95 98   PLT  --  86* 99*   INR 1.31* 2.04* 1.81*    134 137   POTASSIUM 3.9 4.5 4.8   CHLORIDE 103 102 102   CO2 23 24 27   BUN 34* 33* 32*   CR 1.71* 1.73* 1.79*   ANIONGAP 11 8 8   DIANE 7.9* 8.1* 8.5   GLC 93 105* 115*   ALBUMIN  --   --  3.4   PROTTOTAL  --   --  6.8   BILITOTAL  --   --  0.7   ALKPHOS  --   --  85   ALT  --   --  15   AST  --   --  28   TROPI  --   --  <0.015The 99th percentile for upper reference range is 0.045 ug/L.  Troponin values in the range of 0.045 - 0.120 ug/L may be associated with risks of adverse clinical events.       No results found for this or any previous visit (from the past 24 hour(s)).    Robb Parham MD  Pager [phone]

## 2017-02-08 NOTE — PLAN OF CARE
Problem: Goal Outcome Summary  Goal: Goal Outcome Summary  Outcome: No Change  Pt continues to be lethargic and agitated. Attempting to pull at lines and blankets while sleeping. Continue 4 point soft restraints. No oral hydration admin this shift. IVF running, IV acyclovir administered. Pt speech is garbled and cannot be understood. Turn/repo Q2H. Dressings CDI. Rodriguez patent and draining adequately. Will continue to monitor.

## 2017-02-08 NOTE — PROGRESS NOTES
[facility]    Infectious Disease Progress Note    Date of Service (when I saw the patient): 02/08/2017     Assessment and Plan  Jose Manuel Caceres is a 88 year old male who was admitted on 2/5/2017.     Impression:    88 y.o male with CKD.    Admitted with shingles on the right thorax and weakness, confused and agitated overnight.   Today on exam very different then where he was yesterday for example was sitting up and taking to me when compared to today, he is lethargic, confused, garbled speech.   Did get zyprexa last night, ? Cause, vs other.   Very very hard of hearing even with hearing aids.    On IV acyclovir.     Recommendations:    Continue on IV acyclovir, careful eye on kidney function given CKD.    IVF.   Spoke with the sone who confirms has noticed a steady cognitive decline over past several months, consistent with dementia.     Micaela Ashby MD    Interval History   Afebrile   More confused, garbled speech      Physical Exam  Temp: 99.1  F (37.3  C) Temp src: Oral BP: (!) 151/92 mmHg Pulse: 95 Heart Rate: 100 Resp: 19 SpO2: 95 % O2 Device: None (Room air)    Filed Vitals:    02/06/17 0627 02/07/17 0500 02/08/17 0720   Weight: 68.9 kg (151 lb 14.4 oz) 66.2 kg (145 lb 15.1 oz) 67.8 kg (149 lb 7.6 oz)     Vital Signs with Ranges  Temp:  [97.9  F (36.6  C)-99.1  F (37.3  C)] 99.1  F (37.3  C)  Pulse:  [] 95  Heart Rate:  [100] 100  Resp:  [16-20] 19  BP: (132-160)/(87-95) 151/92 mmHg  SpO2:  [94 %-95 %] 95 %    Constitutional: lethargic, moaning, speech is garbled   Lungs: Clear to auscultation bilaterally, no crackles or wheezing  Cardiovascular: Regular rate and rhythm, normal S1 and S2, and no murmur noted  Abdomen: Normal bowel sounds, soft, non-distended, non-tender  Skin: rash on the chest and back with large weeping blister   Other:    Medications    dextrose 5% and 0.45% NaCl + KCl 20 mEq/L 75 mL/hr at 02/08/17 1226     HEParin         metoprolol  5 mg Intravenous Q12H      acyclovir (ZOVIRAX) IV  10 mg/kg Intravenous Q12H       Data  All microbiology laboratory data reviewed.  Recent Labs   Lab Test  02/07/17   0828  02/05/17   2156  10/08/16   0923   WBC  5.5  3.8*  4.2   HGB  11.3*  12.6*  10.5*   HCT  33.6*  38.0*  32.0*   MCV  95  98  96   PLT  86*  99*  103*     Recent Labs   Lab Test  02/08/17   0835  02/07/17   0828  02/05/17 2156   CR  1.71*  1.73*  1.79*     No lab results found.  Recent Labs   Lab Test  02/05/17   2330  02/05/17   2325   CULT  No growth after 2 days  No growth after 2 days

## 2017-02-08 NOTE — PROVIDER NOTIFICATION
Prescriber Notification Note    The pharmacist has communicated with this patient's provider regarding a concern or therapy recommendation.    Notified Person: Dione  Date/Time of Notification: 2/8/17 1100  Interaction: Face to face  Concern/Recommendation:discussed low platelet count. OK to proceed with heparin but with no boluses ever. Ok to start heparin before CT scan.

## 2017-02-08 NOTE — PLAN OF CARE
Problem: Goal Outcome Summary  Goal: Goal Outcome Summary  PT/OT: Patient is still in restraints and agitated per conversation with nursing. Patient is also going for CT today. Cancelling therapies for today as not appropriate.

## 2017-02-08 NOTE — CONSULTS
"CLINICAL NUTRITION SERVICES  -  ASSESSMENT NOTE      Future/Additional Recommendations:   Will order TF if feeding tube placed tomorrow     Malnutrition:   Patient does not meet two of the above criteria necessary for diagnosing malnutrition        REASON FOR ASSESSMENT  Jose Manuel Caceres is a 88 year old male seen by Registered Dietitian for Provider Order - Registered Dietitian to Assess and Order TF per Medical Nutrition protocol.      NUTRITION HISTORY  - Information obtained from pt's wife and son. Pt follows a regular diet at home, no restrictions. He LOVES ice cream and eats \"a lot of it.\"  Over the past 6 months he has had decreased appetite at times and then other times he was always eating something.  He never had nausea of abdominal pains. He ate well PTA.  Wife and son agree with FT placement if necessary.      CURRENT NUTRITION ORDERS  Diet Order:     NPO   Pt is unable to take po due to lethargy. Per MD, will order NGFT tomorrow if he is still confused.       PHYSICAL FINDINGS  Observed  No nutrition-related physical findings observed  Obtained from Chart/Interdisciplinary Team  Per H&P, frail-appearing    ANTHROPOMETRICS  Height: 5' 9\"  Weight: 149 lbs 7.55 oz (67.8 kg)  Body mass index is 22.06 kg/(m^2).  Weight Status:  Normal BMI  IBW: 72.7 kg +/- 10%   % IBW: 93%  Weight History: Confusing. Pt's wife thinks he's lost 40# over the past few months.  Overall, he is down 20# since 1 year ago.  Wt Readings from Last 10 Encounters:   02/08/17 67.8 kg (149 lb 7.6 oz)   10/06/16 61.236 kg (135 lb)   04/04/16 70.761 kg (156 lb)   02/24/16 76.204 kg (168 lb)   02/13/16 76.658 kg (169 lb)       LABS  Labs reviewed    MEDICATIONS  Medications reviewed    Dosing Weight 67.8 kg - current wt    ASSESSED NUTRITION NEEDS:  Estimated Energy Needs: 0537-8040 cals (30-35 Kcal/Kg)  Justification: underweight  Estimated Protein Needs: 70-80 grams protein (1-1.2 g pro/Kg)  Justification: Repletion and " CKD    MALNUTRITION:  % Weight Loss:  Weight loss does not meet criteria for malnutrition   % Intake:  Decreased intake does not meet criteria for malnutrition   Subcutaneous Fat Loss:  None observed  Muscle Loss:  None observed  Fluid Retention:  None noted    Malnutrition Diagnosis: Patient does not meet two of the above criteria necessary for diagnosing malnutrition    NUTRITION DIAGNOSIS:  Inadequate protein-energy intake related to lethargy, confusion as evidenced by currently meeting 0% of needs    NUTRITION INTERVENTIONS  Recommendations / Nutrition Prescription  TF if MD feels appropriate.    Implementation  Nutrition education: explained TF placement, formula, and feeding to wife and son  No interventions at this time, will order TF tomorrow if feeding tube placed.    Nutrition Goals  Pt will begin TF 1-2 days vs ability to resume po diet      MONITORING AND EVALUATION:  Progress towards goals will be monitored and evaluated per protocol and Practice Guidelines    Sherri Mccord RD  Pager [phone] (M-F)            [phone] (W/E & Hol)

## 2017-02-08 NOTE — PLAN OF CARE
Problem: Goal Outcome Summary  Goal: Goal Outcome Summary  Outcome: No Change  Patient remains lethargic; restless.  Opens eyes and mumbles; speech hard to understand.  Four points soft restraints in place; still trying to pull on things.  VSS on RA. No signs of pain.  Patient unable to do any oral intake due to not being awake enough.  Orals cares done.  IVF infusing.  IV Acyclovir.  Dressings to shingles on right chest and back changed this evening.  Rodriguez patent and draining.  Turned and repositioned every 2 hours.  Unable to do MRI this evening; hopefully tomorrow.  Continue to monitor.

## 2017-02-08 NOTE — PLAN OF CARE
Problem: Goal Outcome Summary  Goal: Goal Outcome Summary  Outcome: No Change  Patient lethargic in am and when dressing changed and bath given became restless,aggitatedand pulling on tubes. Was taken from 4 point restraints to 1 and back to 2. Rodriguez patent unable to speak or respond to comands.has temp,dressing changedunable to take in food or pills.Dr notified and changed some to IV.CT done.

## 2017-02-08 NOTE — PROVIDER NOTIFICATION
Prescriber Notification Note    The pharmacist has communicated with this patient's provider regarding a concern or therapy recommendation.    Notified Person: Dione  Date/Time of Notification: 2/8/17 5222  Interaction: Face to face  Concern/Recommendation:discussed IV metoprolol scheduled as well as po metoprolol scheduled     Comments/Additional Details:hold all po scheduled meds until able to take po

## 2017-02-09 NOTE — PLAN OF CARE
Problem: Goal Outcome Summary  Goal: Goal Outcome Summary  Outcome: No Change  Patient remains confused; hard to understand speech.  Sleeping at times but when awake is pulling on blankets/sheets.  Soft wrist restraints continues to be on to protect higgins and IVs.  Heparin gtt infusing.  Recheck Heparin 10a at 2230 this evening.  IVF infusing; continues on IV acyclovir.  VSS on RA.  BP was elevated at first but with recheck when patient was more calm came back down to 138/82.  Patient is on RA.  Patient remains NPO as not alert enough to eat.  Per MD note, may need temporary NGFT.  Contact isolation for shingles.  Dressings intact on right chest and right back.  Higgins patent with good urine output.  Turned and repositioned every 2 hours.  Continue to monitor.

## 2017-02-09 NOTE — PROGRESS NOTES
[facility]    Hospitalist Progress Note    Date of Service (when I saw the patient): 02/09/2017    Assessment and Plan  The patient is an 88-year-old man who was admitted for further treatment with weakness, chest pain, a herpes zoster outbreak and confusion.        1.  Herpes zoster reactivation   -Right upper chest  - continue with IV Acyclovir.   - wound care dressings ordered for large area of invovlement.  -Infectious disease following    2. Acute metabolic encephalopathy.  - Delirium- probably multifactorial; per son the patient has had cognitive decline lately, now with herpes zoster reactivation  -Mental state apparently got worse after receiving Zyprexa 48 hours ago  -There is the possibility of him having sustained a stroke however unable to do MRI given his agitation  - Head CT without acute abnormality  - Psychiatry consult appreciated  - unable to take po, will order iv haldol.    -If does not show improvement in the next 24-48 hours, palliative care might be a reasonable option    3.  Atrial Fibrillation.  - INR sub-therapeutic at 1.4  -CHADsVASC-4  -Pt NPO due to clinical situation  -Currently on heparin drip  -Platelets are stable at the moment, can continue heparin drip; unsure if this will be a quick recovery for him to be back on Coumadin again (although the short-term risk of stroke is pretty low while being off Coumadin)  - HR controlled.    4. Hypertension.     - continue  iv metoprolol scheduled.    -Blood pressure intermittently high but overall acceptable   - p.r.n. Hydralazine    5.  Hyperlipidemia.     - hold Lipitor due to npo status.      6.  History of gout.     - hold on allopurinol due to npo status    7. CKD-III  -Baseline ~2  -At baseline    8. Nutrition.  - patient has been NPO  - On IVF   - patient still confused; - D/w Son Arnulfo over the phone ([phone]) about nutritional option- NGT versus wait and watch.  He is going to discuss with the family and get back  to me.    9. Deconditioning.     - Physical Therapy and Occupational Therapy          D/W: RN  DVT Prophylaxis: Pneumatic Compression Devices  Code Status: Full Code    Disposition: Expected discharge pending clinical course    Alexis Mackenzie MD    Interval History  Still encephalopathic and confused. Continues to be on UE restraints.  Continues to be n.p.o.  Blood pressure slightly better controlled.    -Data reviewed today: I reviewed all new labs and imaging results over the last 24 hours. I personally reviewed no images or EKG's today.    Physical Exam  Temp: 98.6  F (37  C) Temp src: Axillary BP: 159/81 mmHg Pulse: 141 Heart Rate: 98 Resp: 18 SpO2: 95 % O2 Device: None (Room air)    Filed Vitals:    02/07/17 0500 02/08/17 0720 02/09/17 0541   Weight: 66.2 kg (145 lb 15.1 oz) 67.8 kg (149 lb 7.6 oz) 69 kg (152 lb 1.9 oz)     Vital Signs with Ranges  Temp:  [98  F (36.7  C)-98.6  F (37  C)] 98.6  F (37  C)  Pulse:  [] 141  Heart Rate:  [] 98  Resp:  [18-20] 18  BP: (138-199)/() 159/81 mmHg  SpO2:  [93 %-95 %] 95 %  I/O last 3 completed shifts:  In: 1949 [I.V.:1949]  Out: 970 [Urine:970]    Constitutional: Awake and confused, on UE restraints, agitated  HEENT: no oral lesions, No pallor or icterus, minimal drainage both eyes  Neck- no swelling or deformity  Respiratory:  No crackles, No wheezes, CTA B/L, Normal WOB  Cardiovascular: RRR, No murmur  GI: Soft, Non- tender, BS- normoactive, No Guarding/rebound/rigidity  Skin/Integument: Multiple bruises both in upper and lower extremities  MSK: No joint deformity or swelling, no edema  Neuro: Agitated and confused     Medications    dextrose 5% and 0.45% NaCl + KCl 20 mEq/L 75 mL/hr at 02/09/17 0828     HEParin 900 Units/hr (02/09/17 0858)       metoprolol  5 mg Intravenous Q6H     fluticasone  1 spray Both Nostrils Daily     acyclovir (ZOVIRAX) IV  10 mg/kg Intravenous Q12H       Data    Recent Labs  Lab 02/09/17  0813 02/08/17  0835  02/07/17  0828 02/05/17  2156   WBC 5.6  --  5.5 3.8*   HGB 11.0*  --  11.3* 12.6*   MCV 94  --  95 98   *  --  86* 99*   INR 1.40* 1.31* 2.04* 1.81*    137 134 137   POTASSIUM 3.8 3.9 4.5 4.8   CHLORIDE 105 103 102 102   CO2 21 23 24 27   BUN 32* 34* 33* 32*   CR 1.67* 1.71* 1.73* 1.79*   ANIONGAP 11 11 8 8   DIANE 8.0* 7.9* 8.1* 8.5   * 93 105* 115*   ALBUMIN  --   --   --  3.4   PROTTOTAL  --   --   --  6.8   BILITOTAL  --   --   --  0.7   ALKPHOS  --   --   --  85   ALT  --   --   --  15   AST  --   --   --  28   TROPI  --   --   --  <0.015The 99th percentile for upper reference range is 0.045 ug/L.  Troponin values in the range of 0.045 - 0.120 ug/L may be associated with risks of adverse clinical events.       Recent Results (from the past 24 hour(s))   CT Head w/o & w Contrast    Narrative    CT SCAN OF THE HEAD WITHOUT AND WITH CONTRAST   2/8/2017 2:41 PM     HISTORY: Possible stroke. Agitated and unable to have MRI yesterday as  a result.    TECHNIQUE:  Axial images of the head and coronal reformations without  and with 50 mL Isovue-370. Radiation dose for this scan was reduced  using automated exposure control, adjustment of the mA and/or kV  according to patient size, or iterative reconstruction technique.    COMPARISON: 4/4/2016.    FINDINGS:  There is generalized atrophy of the brain.  There is low  attenuation in the white matter of the cerebral hemispheres consistent  with sequelae of small vessel ischemic disease. There is no evidence  of intracranial hemorrhage, mass, acute infarct or anomaly. There are  no contrast enhancing lesions.     There is chronic opacification of the left maxillary sinus with  central calcification and opacification of the left ethmoid air cell.  Remaining sinuses are clear. Right inferior mastoid air cells are  opacified. Left mastoid appears normal. There is no evidence of  trauma.       Impression    IMPRESSION:   1. No acute abnormality.  2. Atrophy of  the brain.  White matter changes consistent with  sequelae of small vessel ischemic disease. This is unchanged.  3. Opacified left maxillary sinus and left anterior ethmoid air cell  and inferior right mastoid air cells. These are unchanged.      DUNCAN CENTENO MD

## 2017-02-09 NOTE — PROGRESS NOTES
02/09/17 1503   General Information   Onset Date 02/06/17   Start of Care Date 02/09/17   Referring Physician Dr. Mackenzie   Patient Profile Review/OT: Additional Occupational Profile Info See Profile for full history and prior level of function   Patient/Family Goals Statement Patient unable to state.    Swallowing Evaluation Bedside swallow evaluation   Behaviorial Observations Alert;Distractible;Confused   Mode of current nutrition NPO   Respiratory Status O2 Supply   Type of O2 supply Nasal cannula   Comments The patient is an 88-year-old man who was admitted for further treatment with weakness, chest pain, a herpes zoster outbreak and confusion.     Clinical Swallow Evaluation   Oral Musculature unable to assess due to poor participation/comprehension   Dentition upper and lower dentures  (Not in at time of the evaluation. )   Secretion Management problems swallowing secretions   Mucosal Quality sticky;dry   Additional Documentation No   Swallow Eval: Clinical Impressions   Skilled Criteria for Therapy Intervention Skilled criteria met.  Treatment indicated.   Functional Assessment Scale (FAS) 1   Diet texture recommendations NPO   Therapy Frequency other (see comments)  (1- f/u session.)   Predicted Duration of Therapy Intervention (days/wks) 1-2 days.   Anticipated Discharge Disposition other (see comments)  (Considering hospice.)   Risks and Benefits of Treatment have been explained. Yes   Patient, family and/or staff in agreement with Plan of Care Yes   Clinical Impression Comments Patient presents with severe oral and pharyngeal dysphagia at bedside. He was agitated and confused at bedside. Not tolerating his own secretions. Suctioning set up but unable to clear due to resistance despite assistance from the nurse. No PO trials given. Recommend: 1. Continue NPO. 2. Wife stated no TF. 3. Consider palliative care. Wife may benfit from a spiritual care. 4. Will f/u one more time.    Total Evaluation Time    Total Evaluation Time (Minutes) 15

## 2017-02-09 NOTE — PROGRESS NOTES
[facility]    Infectious Disease Progress Note    Date of Service (when I saw the patient): 02/09/2017     Assessment and Plan  Jose Manuel Caceres is a 88 year old male who was admitted on 2/5/2017.     Impression:    88 y.o male with CKD.    Admitted with shingles on the right thorax and weakness, confused and agitated overnight. Delirium.   Very very hard of hearing even with hearing aids.    On IV acyclovir.     Recommendations:    Continue on IV acyclovir, careful eye on kidney function given CKD.    IVF.       Spoke with the son who confirms has noticed a steady cognitive decline over past several months, consistent with dementia.     Micaela Ashby MD    Interval History   Afebrile   Agitated and confused, sometime directable       Physical Exam  Temp: 98.6  F (37  C) Temp src: Axillary BP: (!) 199/107 mmHg Pulse: 134 Heart Rate: 98 Resp: 18 SpO2: 95 % O2 Device: None (Room air)    Filed Vitals:    02/07/17 0500 02/08/17 0720 02/09/17 0541   Weight: 66.2 kg (145 lb 15.1 oz) 67.8 kg (149 lb 7.6 oz) 69 kg (152 lb 1.9 oz)     Vital Signs with Ranges  Temp:  [98  F (36.7  C)-98.6  F (37  C)] 98.6  F (37  C)  Pulse:  [] 134  Heart Rate:  [] 98  Resp:  [18-20] 18  BP: (138-199)/() 199/107 mmHg  SpO2:  [93 %-95 %] 95 %    Constitutional: lethargic, moaning, speech is garbled   Lungs: Clear to auscultation bilaterally, no crackles or wheezing  Cardiovascular: Regular rate and rhythm, normal S1 and S2, and no murmur noted  Abdomen: Normal bowel sounds, soft, non-distended, non-tender  Skin: rash on the chest and back with large weeping blister   Other:    Medications    dextrose 5% and 0.45% NaCl + KCl 20 mEq/L 75 mL/hr at 02/08/17 1226     HEParin 900 Units/hr (02/09/17 0049)       metoprolol  5 mg Intravenous Q12H     fluticasone  1 spray Both Nostrils Daily     acyclovir (ZOVIRAX) IV  10 mg/kg Intravenous Q12H       Data  All microbiology laboratory data reviewed.  Recent Labs   Lab  Test  02/07/17   0828  02/05/17   2156  10/08/16   0923   WBC  5.5  3.8*  4.2   HGB  11.3*  12.6*  10.5*   HCT  33.6*  38.0*  32.0*   MCV  95  98  96   PLT  86*  99*  103*     Recent Labs   Lab Test  02/08/17   0835  02/07/17   0828  02/05/17 2156   CR  1.71*  1.73*  1.79*     No lab results found.  Recent Labs   Lab Test  02/05/17   2330  02/05/17   2325   CULT  No growth after 3 days  No growth after 3 days

## 2017-02-09 NOTE — PLAN OF CARE
Problem: Goal Outcome Summary  Goal: Goal Outcome Summary  Patient presents with severe oral and pharyngeal dysphagia at bedside. He was agitated and confused at bedside. Not tolerating his own secretions. Suctioning set up but unable to clear due to resistance despite assistance from the nurse. No PO trials given. Recommend: 1. Continue NPO. 2. Wife stated no TF. 3. Consider palliative care. Wife may benefit from a spiritual care. 4. Will f/u one more time.

## 2017-02-09 NOTE — PLAN OF CARE
Problem: Goal Outcome Summary  Goal: Goal Outcome Summary  Per discussion with PT, pt not following commands, not appropriate for OT intervention this date.

## 2017-02-09 NOTE — PLAN OF CARE
Problem: Goal Outcome Summary  Goal: Goal Outcome Summary  PT: Attempted to see patient for PT eval. Pt is awake and calm today but was unable to follow commands for LE movement or moving in bed. Not appropriate for skilled PT eval today.

## 2017-02-09 NOTE — PLAN OF CARE
Problem: Goal Outcome Summary  Goal: Goal Outcome Summary  Outcome: No Change  Pt oriented to self only. Lungs diminished. Able to speak and ask questions, but is easily distracted. Dressing on back changed, small amount of serosanguinous drainage present. BP initially high, scheduled BP med admin and recheck shows decrease in BP and P. Agitation noted around 0300, prn halidol administered and effective. Rodriguez patent and draining. Heparin dose increased to 9ml/hr in right upper arm. Hep10a recheck entered for am labs. IVF running at 100ml in left upper arm. Attempting to pull at lines when restraints removed, continue with wrist restraints. Turn repo q2h. Will continue to monitor.

## 2017-02-09 NOTE — CONSULTS
PSYCHIATRY CONSULTATION       DATE OF SERVICE:  02/09/2017       REQUESTING PHYSICIAN:  Dr. Parham      REASON FOR CONSULTATION:   Delirium        IDENTIFYING DATA:   Jose Manuel Caceres is an 88-year-old  male admitted to station [facility] with generalized weakness and altered mental status, likely recent activation of herpes simplex virus causing shingles.      CHIEF COMPLAINT:  None due to somnolence.        HISTORY OF PRESENT ILLNESS:  The patient is an 88-year-old gentleman followed by Dr. Jason Phillip.  He has a complicated medical history and last fall, underwent repair of an abdominal aortic aneurysm and has a history of atrial fibrillation, stage 3 kidney disease, dyslipidemia, osteoporosis and hypertension.  According to records, he is hard of hearing and came in by EMS feeling weak for a number of days.  He could not get out of bed and his wife noted that he was quite confused.  Here in the hospital, he has been easily distracted and agitated.  They have been using some p.r.n. Haldol and it looks like some p.r.n. Seroquel.  He was attempting to pull lines out and eventually needed to go into restraints.  This morning he is somnolent, lying in bed on his back, eyes closed, snoring loudly.  I am unable to arouse him, though he stirs slightly when I do a sternal rub.  They have been contemplating whether he might have a herpes simplex encephalitis but they are covering him with IV acyclovir.  They have some p.r.n. Zyprexa Zydis written for along with IV Haldol.  He has been unable to take oral medications due to his agitation and lack of cooperation.  Review of records does not indicate any previous delirium within our system and he was not on any psychotropic medications prior to admission.      PAST PSYCHIATRIC HISTORY:  As above, essentially unremarkable based on review of records.      PAST CHEMICAL DEPENDENCY HISTORY:  None reported.      PAST MEDICAL HISTORY:  Includes abdominal aneurysm repair, gastritis,  hypertension, osteoporosis, chronic kidney disease, atrial fibrillation, altered mental status this admission, herpes zoster on right thorax this admission.        MEDICATIONS:  Listed in the  hospital:  Acyclovir IV, Flonase, Isovue, Lopressor, dextrose, heparin infusion, acetaminophen, allopurinol, Lipitor, Dulcolax, vitamin D, Haldol 1 mg oral q.6 h. p.r.n., 2 mg IV q.6 h. p.r.n., hydralazine, milk of magnesia, Lopressor, Ocuvite, Narcan, Zyprexa 2.5 mg b.i.d. p.r.n. for agitation, Zofran, Seroquel 25 mg each day at bedtime, 6.25 to 12.5 mg q.6 h. p.r.n., Senokot-S.      FAMILY AND SOCIAL HISTORY:  I do not have a lot of details.  I know that he has a father with Alzheimer's dementia.  He is  and lives with his wife in their house.  There is no report of any heavy alcohol consumption or other risk factors for delirium from substance use.        REVIEW OF SYSTEMS:  A 10-point review includes somnolence on neurologic exam.  The remainder of his 10-point review of systems is negative.  Most recent vital signs:  Temperature 98, pulse 97, respiratory rate 20, blood pressure 154/95, oxygen saturation 95%.      MENTAL STATUS EXAMINATION:  This is limited due to the patient's somnolence.  He is lying in bed with his eyes closed, snoring loudly, stirs slightly to sternal rub, the remainder of the examination is not feasible due to sedation.  Cognitive exam is not feasible though nursing reports indicate that he has been agitated and confused.      IMPRESSION:  The patient is an 88-year-old man presenting with symptoms of delirium.  His age and recent abdominal aortic aneurysm repair convey some increased risk for delirium.  There is a chance that he could have herpes simplex encephalitis and he is being covered with IV acyclovir.  An EEG might be useful at some point since this can indicate very specific EEG patterns including partially lateralized epileptiform discharges that would signal a herpes simplex  encephalitis though I think the overall risk is low.  I would treat this expectantly.  I agree with the use of p.r.n. Haldol and this should be used IV instead of oral.  There is an oral dose of Zyprexa available in Zydis form.      DIAGNOSES:   1.  Delirium, multifactorial.   2.  Herpes zoster.   3.  Rule out herpes simplex encephalitis.    4.  Abdominal aortic aneurysm repair.      PLAN:   1.  Would manage this conservatively, I agree with use of p.r.n. IV Haldol and p.r.n. Zyprexa.  I would not use oral Haldol so I discontinued this due to the high risk of extrapyramidal side effects.   2.  Continue IV acyclovir, agree with consideration of Infectious Disease consultation.  Neurology should get involved if there is a high suspicion for herpes simplex encephalitis, EEG could be confirmatory in a situation like this, though he has multiple risk factors for delirium given the age and multiple medical issues that are preexisting.   3.  We will follow as needed.         FRANCISCO DODD MD             D: 2017 07:24   T: 2017 08:41   MT: yg      Name:     JUAN QUESADA   MRN:      [id number]        Account:       [id number]   :      1928           Consult Date:  2017      Document: [id number]

## 2017-02-10 NOTE — PROGRESS NOTES
Care Transition Initial Assessment - ANTELMO  Reason For Consult: end of life/hospice, facility placement  Met with: Family-sonBryn & pt's spouse, Quincy  Active Problems:    Herpes zoster         DATA  Lives With: spouse     Identified issues/concerns regarding health management: Hospice consult placed for pt. DC disposition to be discussed with pt's family. Pt is currently disoriented with episodes of agitation and confusion. Pt is not able to handle his secretions and TF has been recommended but declined by pt's family. They would like to pursue comfort cares/hospice and placement in SNF.   Patient feels that they have adequate support @ home?  Yes: Pt's spouse is present and involved. Pt's son assists and pt has family/friends who are supportive.     ASSESSMENT  Cognitive Status:  disoriented, delirious and agitated    Concerns to be addressed:   Per bed side nurse and CC, pt's family spoke with Dr. Franco this afternoon and they would like to obtain further information on hospice benefits & DC options. SW met with pt's sonBryn & pt's spouse, Quincy to discuss DC plans. Both individuals feel that pt will be best cared for by hospice and transitioning to comfort cares. Pt was seen by [facility] and they have recommended a TF; Quincy said that a TF is not something pt would want. At this time they are considering comfort cares. They plan to keep pt on ABX one more day and, if no improvements, they would like to transition him to comfort measures only. SW discussed DC options, including SNF placement, home with hospice or residential hospice facilities. Pt's family was presented Hospice agency choice and they prefer to utilize  Hospice for continuity of care. Pt is a service connected  and he currently receives VA benefits at 80% coverage (per Bryn). Pt also has a LTC policy which may assist with coverage at [place]. uQincy is not able to care for pt at home and feels he would be best suited at a SNF. SW provided  Alma with a list of VA contracted facilities. They would like pt to DC near his home in [place] so they asked for referrals to be sent to [facility]. [facility] and 2. [facility]. SW placed referrals via [facility] for placement tomorrow, 2/11. [facility] are able to meet with  Hospice tomorrow, 2/11 at 1300 and ANTELMO has arranged this meeting to occur in the hospital via Flaca Breen from [facility]. ANTELMO provided information to pt's family re: placement timing and transportation via stretcher at time of DC; family agrees with DC plans.      PLAN  Patient Goals and Preferences: Pt's family's goal is for pt to transition to comfort cares and DC to SNF with [facility]. [facility] meeting, in hospital, on 2/11 at 1300.   Patient anticipates discharging to:  SNF with [facility], once stable and SNF bed is secured.    LUKASZ Neri, LGSW [phone]

## 2017-02-10 NOTE — PLAN OF CARE
Problem: Goal Outcome Summary  Goal: Goal Outcome Summary  PT and OT: Discussed status with RN, pt not appropriate for therapy evals today.

## 2017-02-10 NOTE — PLAN OF CARE
Problem: Goal Outcome Summary  Goal: Goal Outcome Summary  alert to self only, agitated at times, sitter at bed side, / 90,changed metoprolol q 6hrs to q 4hrs and added PRN BP meds, HR tachy 100s - 120s, sats 92-96% on 6lo2 per oxymask, LS diminished, occassional productive cough, unable to cough up,oral suctioned x 2,code status changed to DNR/DNI, family talked to MD and ANTELMO about hospice, continues on IV abx, turned and repositioned q 2hrs, had incontinent bm x 2, IVF at 75cc/hr.

## 2017-02-10 NOTE — PLAN OF CARE
Problem: Goal Outcome Summary  Goal: Goal Outcome Summary  Outcome: Declining  A&O to self, garbled speech. Agitated at times, sitter at bedside.  BP elevated, last 156/92. 's-120's, on 6-8L via oxymask sats in the low to mid 90's.  MD notified of increase in O2 needs, chest x-ray, blood cultures, ABG's, and one time dose of IV lasix done.  New diagnosis of pneumonia.  Started on IV vanco, levaquin, and zosyn-see MD note. Heparin gtt at 9ml/hr, next hep 10a this am. IVF infusing at 75/hr. NPO. Dressing to chest, back, and side CDI.  IV acyclovir.  LS course. ID following. Michael patent. D/C pending, Nursing will continue to monitor.

## 2017-02-10 NOTE — PHARMACY-VANCOMYCIN DOSING SERVICE
Pharmacy Vancomycin Initial Note  Date of Service February 10, 2017  Patient's  1928  88 year old, male    Indication: Healthcare-Associated Pneumonia    Current estimated CrCl = Estimated Creatinine Clearance: 29.8 mL/min (based on Cr of 1.67).    Creatinine for last 3 days  2017:  8:28 AM Creatinine 1.73 mg/dL*  2017:  8:35 AM Creatinine 1.71 mg/dL*  2017:  8:13 AM Creatinine 1.67 mg/dL*    Recent Vancomycin Level(s) for last 3 days  No results found for requested labs within last 3 days.      Vancomycin IV Administrations (past 72 hours)      No vancomycin orders with administrations in past 72 hours.                Nephrotoxins and other renal medications (Future)    Start     Dose/Rate Route Frequency Ordered Stop    02/10/17 0200  piperacillin-tazobactam (ZOSYN) 3.375 g vial to attach to  mL bag      3.375 g  over 1 Hours Intravenous EVERY 6 HOURS 02/10/17 0153      02/10/17 0200  vancomycin (VANCOCIN) 1500 mg in 0.9% NaCl 250 mL PREMIX      1,500 mg Intravenous EVERY 48 HOURS 02/10/17 0159      17 1400  acyclovir (ZOVIRAX) 800 mg in D5W 250 mL intermittent infusion      10 mg/kg × 77.1 kg Intravenous EVERY 12 HOURS 17 0454            Contrast Orders - past 72 hours (72h ago through future)    Start     Dose/Rate Route Frequency Ordered Stop    17 1400  iopamidol (ISOVUE-370) solution 50 mL      50 mL Intravenous ONCE 17 1356 17 1435                Plan:  1.  Start vancomycin  1500 mg IV q48h.   2.  Goal Trough Level: 15-20 mg/L   3.  Pharmacy will check trough levels as appropriate in 1-3 Days.    4. Serum creatinine levels will be ordered daily for the first week of therapy and at least twice weekly for subsequent weeks.    5. Filley method utilized to dose vancomycin therapy: Method 1    Mario Anderson

## 2017-02-10 NOTE — PROGRESS NOTES
HOSPITALIST SERVICE CROSS-COVER NOTE:  Called by RN for increase in O2 requirement. Patient was examined and does not appear overtly volume overloaded.  Will get CXR and ABG.    ADDENDUM: CXR shows left lower lobe pneumonia, possible right upper lobe pneumonia, and bilateral pleural effusions.  I ordered blood cultures, one dose of Lasix 40 mg x1 (for vascular congestion) and broad spectrum antibiotic therapy for HCAP (LQ, Vanc, Zosyn).    Curt Lassiter MD  Hospitalist  Pager # [phone]

## 2017-02-10 NOTE — PROGRESS NOTES
[facility] [place]    Hospitalist Progress Note    Date of Service (when I saw the patient): 02/10/2017    Assessment and Plan  The patient is an 88-year-old man who was admitted for further treatment with weakness, chest pain, a herpes zoster outbreak and confusion.        1.  Herpes zoster reactivation: Right upper chest  - continue with IV Acyclovir.   - wound care dressings ordered for large area of invovlement.  - Infectious disease following    2. Acute metabolic encephalopathy: Delirium- probably multifactorial; per son the patient has had cognitive decline lately, now with herpes zoster reactivation.   -Mental state apparently got worse after receiving Zyprexa 48 hours ago  -There is the possibility of him having sustained a stroke however unable to do MRI given his agitation, Head CT without acute abnormality  - Psychiatry consult appreciated  - unable to take po, IV haldol PRN.      3.  Atrial Fibrillation: CHADsVASC-4: was on warfarin, on hold due to NPO, INR sub therapeutic, remains on heparin drip.   - Platelets are stable at the moment, can continue heparin drip; unsure if this will be a quick recovery for him to be back on Coumadin again (although the short-term risk of stroke is pretty low while being off Coumadin)  - HR controlled.    4. Possible aspiration pneumonia: Wworsening dyspnea and hypoxia overnight 2/9  -CXR consistent with bilateral pneumonia  -has worsening hypoxia, needing 8L oxymask now  - started on IV abx overnight 2/9, remains on zosyn per ID  - Has upper airway gurgle, can't clear secretion    5.  Hyperlipidemia.     - hold Lipitor due to npo status.      6.  History of gout.     - hold on allopurinol due to npo status    7. CKD-III  -Baseline ~2  -At baseline    8. Nutrition.  - patient has been NPO, on IVF   - patient remains confused - D/w Son jaimie about nutritional option, NGT versus wait and watch. Now that patient has   He is going to discuss with the family and get  back to me.    9.  Hypertension.     - Incerase  iv metoprolol scheduled to 5 mg IV q4 hr  - Blood pressure remains high intermittently  - p.r.n. Hydralazine ordered      D/W: RN, patient's son/wife    DVT Prophylaxis: Pneumatic Compression Devices/heparin drip    Code Status: DNR/DNI, this was updated today, Has upper airway gurgle, can't clear secretion, family considering hospice at this time if unchanged in next 24 hours. Not a candidate for BiPAP if respiratory status worsens in which situation, not to do BiPAP, but family ok with starting comfort care medication. They want to be notified if worsens, otherwise want to wait another day.     Disposition: Expected discharge pending clinical course. Discussed with care co ordinator as family want to know about hospice discharge options.    Liu Franco MD  Hospitalist    Interval History     Still encephalopathic and remains confused but calm. Continues to be n.p.o.    -was hypoxic overnight, CXR showed bilateral PNA, now on abx.      -Data reviewed today: I reviewed all new labs and imaging results over the last 24 hours. I personally reviewed no images or EKG's today.    Physical Exam  Temp: 97.3  F (36.3  C) Temp src: Oral BP: 146/90 mmHg   Heart Rate: 107 Resp: 18 SpO2: 96 % O2 Device: Oxymask Oxygen Delivery: 8 LPM  Filed Vitals:    02/07/17 0500 02/08/17 0720 02/09/17 0541   Weight: 66.2 kg (145 lb 15.1 oz) 67.8 kg (149 lb 7.6 oz) 69 kg (152 lb 1.9 oz)     Vital Signs with Ranges  Temp:  [97.3  F (36.3  C)-97.9  F (36.6  C)] 97.3  F (36.3  C)  Heart Rate:  [] 107  Resp:  [18] 18  BP: (146-173)/() 146/90 mmHg  SpO2:  [90 %-97 %] 96 %  I/O last 3 completed shifts:  In: 2000 [I.V.:2000]  Out: 1600 [Urine:1600]    Constitutional: Awake and confused   HEENT: no oral lesions, No pallor or icterus, minimal drainage both eyes  Neck- no swelling or deformity  Respiratory: coarse bilaterally with widespread crackles, no wheezing, has upper airway  gurgle- can't clear secretion. On 8L facemask.  Cardiovascular: RRR, No murmur  GI: Soft, Non- tender, BS- normoactive, No Guarding/rebound/rigidity  Skin/Integument: Multiple bruises both in upper and lower extremities  MSK: No joint deformity or swelling, no edema  Neuro: confused but calm.    Medications    dextrose 5% and 0.45% NaCl + KCl 20 mEq/L 75 mL/hr at 02/10/17 1020     HEParin 900 Units/hr (02/09/17 2044)       piperacillin-tazobactam  3.375 g Intravenous Q6H     metoprolol  5 mg Intravenous Q6H     moxifloxacin  1 drop Both Eyes TID     fluticasone  1 spray Both Nostrils Daily     acyclovir (ZOVIRAX) IV  10 mg/kg Intravenous Q12H       Data    Recent Labs  Lab 02/10/17  0828 02/09/17  0813 02/08/17  0835 02/07/17  0828 02/05/17  2156   WBC 4.4 5.6  --  5.5 3.8*   HGB 10.6* 11.0*  --  11.3* 12.6*   MCV 93 94  --  95 98   * 100*  --  86* 99*   INR 1.83* 1.40* 1.31* 2.04* 1.81*    137 137 134 137   POTASSIUM 3.7 3.8 3.9 4.5 4.8   CHLORIDE 102 105 103 102 102   CO2 23 21 23 24 27   BUN 31* 32* 34* 33* 32*   CR 1.77* 1.67* 1.71* 1.73* 1.79*   ANIONGAP 9 11 11 8 8   DIANE 7.6* 8.0* 7.9* 8.1* 8.5   * 129* 93 105* 115*   ALBUMIN  --   --   --   --  3.4   PROTTOTAL  --   --   --   --  6.8   BILITOTAL  --   --   --   --  0.7   ALKPHOS  --   --   --   --  85   ALT  --   --   --   --  15   AST  --   --   --   --  28   TROPI  --   --   --   --  <0.015The 99th percentile for upper reference range is 0.045 ug/L.  Troponin values in the range of 0.045 - 0.120 ug/L may be associated with risks of adverse clinical events.       Recent Results (from the past 24 hour(s))   XR Chest Port 1 View    Narrative    XR CHEST PORT 1 VW  2/10/2017 1:15 AM      HISTORY: CHF, pneumonia.     COMPARISON: 9/1/2009.    FINDINGS: Upright portable chest. The heart is at the upper limits of  normal in size. There is pulmonary vascular congestion without  pulmonary edema. There are bilateral pleural effusions. There  is  infiltrate in the left lower lung. Possible infiltrate in the right  upper lobe. No pneumothorax. Thoracic aorta is tortuous.      Impression    IMPRESSION:  1. Left lower lobe pneumonia. Possible right upper lobe pneumonia.  2. Bilateral pleural effusions.    JUANJOSE JACOBSON MD

## 2017-02-10 NOTE — PLAN OF CARE
Problem: Goal Outcome Summary  Goal: Goal Outcome Summary  SLP: Per chart review and RN, pt not appropriate for therapy today. RN stated family is leaning towards hospice. Cx session and SLP will check back tomorrow.

## 2017-02-10 NOTE — PROVIDER NOTIFICATION
MD Notification    Notified Person:  MD    Notified Persons Name:Dr. Lassiter    Notification Date/Time: 2/10/17 @0040    Notification Interaction:  Talked with Physician    Purpose of Notification: Increased O2 demands, sats in upper 80's on 8L oxymask, LS course.    Orders Received: Stat chest x-rays, ABG's    Comments: Dr. Lassiter came to assess patient.

## 2017-02-10 NOTE — PROGRESS NOTES
[facility]    Infectious Disease Progress Note    Date of Service (when I saw the patient): 02/10/2017     Assessment and Plan  Jose Manuel Caceres is a 88 year old male who was admitted on 2/5/2017.     Impression:    88 y.o male with CKD.    Admitted with shingles on the right thorax and weakness, confused and agitated. Delirium.   Very very hard of hearing even with hearing aids.    On IV acyclovir.   New concern of pneumonia, high risk of aspiration on his own secretions given delirium       Recommendations:    Continue on IV acyclovir, careful eye on kidney function given CKD.    Was started on Vancomycin, Zosyn and Levaquin, continue on zosyn alone high risk for aspiration.       Spoke with the son who confirms has noticed a steady cognitive decline over past several months, consistent with dementia.     Micaela Ashby MD    Interval History   Afebrile   Agitated and confused, sometime directable       Physical Exam  Temp: 97.3  F (36.3  C) Temp src: Oral BP: 146/90 mmHg Pulse: 114 Heart Rate: 97 Resp: 18 SpO2: 97 % O2 Device: Oxymask Oxygen Delivery: 8 LPM  Filed Vitals:    02/07/17 0500 02/08/17 0720 02/09/17 0541   Weight: 66.2 kg (145 lb 15.1 oz) 67.8 kg (149 lb 7.6 oz) 69 kg (152 lb 1.9 oz)     Vital Signs with Ranges  Temp:  [97.3  F (36.3  C)-97.9  F (36.6  C)] 97.3  F (36.3  C)  Pulse:  [114-141] 114  Heart Rate:  [] 97  Resp:  [18] 18  BP: (142-173)/() 146/90 mmHg  SpO2:  [90 %-97 %] 97 %    Constitutional: lethargic, moaning, speech is garbled   Lungs: Clear to auscultation bilaterally, no crackles or wheezing  Cardiovascular: Regular rate and rhythm, normal S1 and S2, and no murmur noted  Abdomen: Normal bowel sounds, soft, non-distended, non-tender  Skin: rash on the chest and back with large weeping blister   Other:    Medications    dextrose 5% and 0.45% NaCl + KCl 20 mEq/L 75 mL/hr at 02/09/17 1813     HEParin 900 Units/hr (02/09/17 2044)       piperacillin-tazobactam   3.375 g Intravenous Q6H     metoprolol  5 mg Intravenous Q6H     moxifloxacin  1 drop Both Eyes TID     fluticasone  1 spray Both Nostrils Daily     acyclovir (ZOVIRAX) IV  10 mg/kg Intravenous Q12H       Data  All microbiology laboratory data reviewed.  Recent Labs   Lab Test  02/09/17   0813  02/07/17   0828  02/05/17   2156   WBC  5.6  5.5  3.8*   HGB  11.0*  11.3*  12.6*   HCT  32.0*  33.6*  38.0*   MCV  94  95  98   PLT  100*  86*  99*     Recent Labs   Lab Test  02/09/17   0813  02/08/17   0835  02/07/17   0828   CR  1.67*  1.71*  1.73*     No lab results found.  Recent Labs   Lab Test  02/10/17   0210  02/10/17   0205  02/05/17   2330  02/05/17   2325   CULT  No growth after 2 hours  No growth after 2 hours  No growth after 4 days  No growth after 4 days

## 2017-02-11 NOTE — PLAN OF CARE
Problem: Goal Outcome Summary  Goal: Goal Outcome Summary  Speech Language Therapy Discharge Summary    Reason for therapy discharge:    Patient/family request discontinuation of services.    Progress towards therapy goal(s). See goals on [facility] record for goal details.  Goals not met.  Barriers to achieving goals:   limited tolerance for therapy.    Therapy recommendation(s):    No further therapy is recommended. Pt is now comfort care.

## 2017-02-11 NOTE — CONSULTS
This writer met went to  [place] to meet with patient, spouse Quincy, and sons Bryn, Arnulfo, and Eugene to discuss Medicare hospice philosophy, benefits, and services.    CONSENTS WERE NOT OBTAINED TODAY.    Patient is an 88 year old, admitted 2/5/17 with zoster, acute metabolic encephalitis, and aspiration pneumonia.  Patient did not participate in meeting, but spouse and sons did.  Discussed hospice and they are all in agreement with comfort cares only.  Stated they wish no feeding tubes and are also discussing how long they wish to continue antibiotics.   SW faxed info to both [facility] and [facility], as patient has VA benefits per family.  SW still awaiting call back for confirmation and states that it may be Monday before she has an answer due to facility needing to run VA benefit search.  Family is leaning towards ECC.    Tentative plan is for patient to discharge at 1pm on Monday, 2/13/17 and be met by a hospice admission team at his facility of choice at 3:30 pm so that consents can be obtained.    Call placed to Babatunde in hospice office and patient is on schedule for 3:30pm Monday, 2/13/17.    Would suggest ordering routine hospice comfort meds to be filled here at hospital and sent with patient to facility.  DISCHARGE MEDS WILL NEED TO BE BUBBLE PACKED AND CONTAIN NO RANGES.    Thank you for this referral.  Please contact  Hospice at [phone] with time and place of discharge once determined on Monday or any changes to discharge plan.    Marcia Christopher RN   Hospice Admission Clinician

## 2017-02-11 NOTE — PROGRESS NOTES
[facility]    Hospitalist Progress Note    Date of Service (when I saw the patient): 02/11/2017    Assessment and Plan  The patient is an 88-year-old man who was admitted for further treatment with weakness, chest pain, a herpes zoster outbreak and confusion.        1.  Herpes zoster reactivation: Right upper chest  - Is being treated with IV acyclovir, discontinue as he is comfort care now.    2. Acute metabolic encephalopathy: Delirium- probably multifactorial; per son the patient has had cognitive decline lately, now with herpes zoster reactivation.   -Mental state apparently got worse after receiving Zyprexa 48 hours ago. There is the possibility of him having sustained a stroke however unable to do MRI given his agitation, Head CT without acute abnormality. Psychiatry consult appreciated  - Comfort care now    3.  Atrial Fibrillation: CHADsVASC-4: was on warfarin, on hold due to NPO, INR sub therapeutic, so was managed with heparin drip. Discontinued.    4. Possible aspiration pneumonia: Wworsening dyspnea and hypoxia overnight 2/9  -CXR consistent with bilateral pneumonia  -has worsening hypoxia, now comfort care.    5.  Hyperlipidemia.     6.  History of gout.     7. CKD-III  8. Hypertension.         DVT Prophylaxis: not indicated    Code Status: DNR/DNI, this was updated 2/10 after discussing with his son and wife. Comfort care now.    Disposition: Expected discharge likely to hospice.  consult for hospice referral.    Liu Franco MD  Hospitalist    Interval History     Still encephalopathic and remains confused but calm. Continues to be n.p.o. Worsening hypoxia now requiring increased supplemental O2.  -unable to obtain ROS given his mental status.    -Data reviewed today: I reviewed all new labs and imaging results over the last 24 hours. I personally reviewed no images or EKG's today.    Physical Exam  Temp: 97.6  F (36.4  C) Temp src: Axillary BP: 149/85 mmHg Pulse: 103 Heart  Rate: 94 Resp: 18 SpO2: 97 % O2 Device: Oxymask Oxygen Delivery: 12 LPM  Filed Vitals:    02/07/17 0500 02/08/17 0720 02/09/17 0541   Weight: 66.2 kg (145 lb 15.1 oz) 67.8 kg (149 lb 7.6 oz) 69 kg (152 lb 1.9 oz)     Vital Signs with Ranges  Temp:  [97.6  F (36.4  C)-98.1  F (36.7  C)] 97.6  F (36.4  C)  Pulse:  [] 103  Heart Rate:  [] 94  Resp:  [18] 18  BP: (116-166)/() 149/85 mmHg  SpO2:  [96 %-98 %] 97 %  I/O last 3 completed shifts:  In: 1633.3 [I.V.:1633.3]  Out: 2750 [Urine:2750]    Constitutional: Awake and confused.  HEENT: no oral lesions, No pallor or icterus, minimal drainage both eyes  Neck- supple  Respiratory: coarse bilaterally with widespread crackles, no wheezing, On 10L facemask.  Cardiovascular: RRR, No murmur  GI: Soft, Non- tender   Skin/Integument: Multiple bruises both in upper and lower extremities  MSK: No joint deformity or swelling, no edema  Neuro: confused but calm.    Medications       fluticasone  1 spray Both Nostrils Daily       Data    Recent Labs  Lab 02/11/17  0758 02/10/17  0828 02/09/17  0813 02/08/17  0835  02/05/17  2156   WBC 5.1 4.4 5.6  --   < > 3.8*   HGB 10.4* 10.6* 11.0*  --   < > 12.6*   MCV 92 93 94  --   < > 98   * 118* 100*  --   < > 99*   INR 2.24* 1.83* 1.40* 1.31*  < > 1.81*   NA  --  134 137 137  < > 137   POTASSIUM  --  3.7 3.8 3.9  < > 4.8   CHLORIDE  --  102 105 103  < > 102   CO2  --  23 21 23  < > 27   BUN  --  31* 32* 34*  < > 32*   CR 1.88* 1.77* 1.67* 1.71*  < > 1.79*   ANIONGAP  --  9 11 11  < > 8   DIANE  --  7.6* 8.0* 7.9*  < > 8.5   GLC  --  124* 129* 93  < > 115*   ALBUMIN  --   --   --   --   --  3.4   PROTTOTAL  --   --   --   --   --  6.8   BILITOTAL  --   --   --   --   --  0.7   ALKPHOS  --   --   --   --   --  85   ALT  --   --   --   --   --  15   AST  --   --   --   --   --  28   TROPI  --   --   --   --   --  <0.015The 99th percentile for upper reference range is 0.045 ug/L.  Troponin values in the range of 0.045 -  0.120 ug/L may be associated with risks of adverse clinical events.   < > = values in this interval not displayed.    No results found for this or any previous visit (from the past 24 hour(s)).

## 2017-02-11 NOTE — PLAN OF CARE
Problem: Goal Outcome Summary  Goal: Goal Outcome Summary  Outcome: Declining  Alert to self. Switched to comfort cares. RR 17-24. C/o pain, relieved w/ morphine. T/R for comfort. Rodriguez patent. Family at bedside and supportive.

## 2017-02-11 NOTE — PLAN OF CARE
Problem: Goal Outcome Summary  Goal: Goal Outcome Summary  Outcome: No Change  Pt oriented to self only. BP and P elevated on scheduled metoprolol, VS otherwise stable on 10lpm via mask. Lungs coarse. Periods of agitation, sitter at bedside. Turn repo Q2H, incontinent of bowel x1. Continue with IV zosyn, acyclovir. Heparin running at 9ml/hr, hep10a scheduled to be drawn this am. IVF running at 75 ml/hr. Will continue to monitor.

## 2017-02-11 NOTE — PROGRESS NOTES
Spiritual Assessment Progress Note  [facility]      PRIMARY FOCUS:      Goals of care    Symptom/pain management    Emotional/spiritual/Yazidism distress    Support for coping    ILLNESS CIRCUMSTANCES:    Reviewed documentation. Reflective conversation shared with zonia's family which integrated elements of illness and family narratives.         Context of Serious Illness/Symptom(s) -  paged to room for end of life ritual.  3 sons and spouse present.  Pt will go to hospice on Monday but family wanted prayers before then.  Spent some time talking with family about pt.  One of the pt's sons anointed while  read prayers.  Sons are concerned about pt being in pain and it seems that the family is ready to let go of pt.    Resources for Support - family      DISTRESS:      Emotional/ ExistentialRelational Distress - grief for family    Spiritual/Mandaen Distress - not discussed    Social/Cultural/EconomicDistress - not discussed       SPIRITUAL/Episcopal (Coping):      Zoroastrian/Makayla - Scientologist    Spiritual Practice(s) - not discussed    Emotional/Existential/ Relationall/Connections - family      GOALS OF CARE:    Goals of Care - providing comfort for family    Meaning/Sense-Making - not discussed      PLAN:  can follow if family requests more services.      Pattie Grubbs  Chaplain Resident  Pager [phone]

## 2017-02-11 NOTE — PLAN OF CARE
Problem: Goal Outcome Summary  Goal: Goal Outcome Summary  Outcome: No Change  A/O to self. Bedrest. Total care. BP and HR elevated, on scheduled IV metoprolol. Otherwise VSS on 12L facemask. Contact precautions maintained. Rodriguez in place w/adequate output. Incontinent of bowel. IVF infusing. Heparin gtt @9mL/hr. NPO. ID following. Sitter at bedside/ D/C pending. Continue to monitor.

## 2017-02-11 NOTE — PLAN OF CARE
Problem: Goal Outcome Summary  Goal: Goal Outcome Summary  PT/OT: Noted orders discontinued as pt has transitioned to comfort cares and will be discharged on hospice. No skilled therapy needs present.

## 2017-02-12 NOTE — PROGRESS NOTES
02/12/17 1500   Visit Information   Type of Visit Follow-up   SPIRITUAL HEALTH SERVICES  Progress Note  [facility]  Pt  Unconscious, but seemingly comfortable; pt's wife Quincy present and supportive; pt's sons Bryn and Eugene present; son Demetrius out of room. Pt's grandson Rell and his wife Lori present. Family calm, enjoying Wild hockey game, reminiscing about pt's life, including his  service in [employer]. Family presents as at peace, realistic about pt's prognosis, calm and grateful for pt and for pt's care. Pt grateful for blessing of pt received by  yesterday by  Pattie Grubbs.      provided emotional support to family through listening, reminiscing about pt's life, celebrating family's growth and connections that give them bladimir and vitality, and words of comfort and assurance. Prayer provided at family request.      team available for emotional and spiritual care support.         GERMAN Turcios.[facility]  Staff   Pager [phone]

## 2017-02-12 NOTE — SIGNIFICANT EVENT
House MD called re: pronouncement of death    Preliminary cause of death:   Complications from acute metabolic encephalopathy     Time of death: 1635    Exam:  Pupils fixed and midpoint  No breath or heart sounds    MD Rito Puga Physician     /15 min

## 2017-02-12 NOTE — PLAN OF CARE
Problem: Goal Outcome Summary  Goal: Goal Outcome Summary  Outcome: Declining  Comfort cares. PRN morphine given x1. Respirations irregular and apneic at times. Rodriguez in place. NPO. Contact precautions for shingles. D/C to hospice Monday. Continue to monitor.

## 2017-02-12 NOTE — PLAN OF CARE
Problem: Goal Outcome Summary  Goal: Goal Outcome Summary  Outcome: Declining  Pt on comfort cares. No periods of alertness this shift. Shallow respirations noted with short periods of apnea. No agitation noted this shift. Rodriguez patent and draining adequately. Plan to d/c with hospice on Monday. Will continue to monitor.

## 2017-02-12 NOTE — PLAN OF CARE
Problem: Goal Outcome Summary  Goal: Goal Outcome Summary  Outcome: Declining  Arouses to stimulation, garbled speech. RR 11-28, dyspnea improved w/ morphine. Atropine given for secretions. Suctioning provided for comfort. Son at bedside and supportive.

## 2017-02-12 NOTE — PROGRESS NOTES
"[facility] PROGRESS NOTE    I: ANTELMO received a call from Smita with [facility] admissions who stated that they have LTC beds available and she will review pt's referral but feels there would most likely be an open bed for pt on Monday, 2/13. Smita reports that Veterans benefits need to be confirmed prior to pt's admission and she is not able to do that over the weekend. She would like to anticipate admission to [facility] on 2/13.   ANTELMO met with pt's family and JACINTA Kennedy from [facility]. Pt was being visited by his spouse and their three sons. Pt was experiencing some agitation and pt's family verbalized their belief that hospice and comfort measures seem appropriate; they do not want pt to be in pain. Pt's family is all in agreement that pt \"would not want to live this way\" and that they feel they \"have made the best choice to seek comfort cares at this time.\" Pt's family met with Dr. Franco earlier in the day and pt was transferred to comfort measures only.   Pt's son Bryn and pt's spouse, Quincy were able to visit [facility] and [facility] yesterday. Pt's family would like to pursue [facility]-this is their preferred facility. ANTELMO called Smita in admissions at [facility] (cell phone) and left a message with an update about pt's family choosing [facility]. ANTELMO has not heard back from [facility] and was unable to receive an answer about bed availability from [facility] today. Marcia from  Hospice informed writer that  Hospice staff is booked this weekend and would not be able to meet pt's family at facility until Monday 2/13 or possibly Tuesday, 2/14. Due to benefit check needs and staffing for  Hospice, it was determined that pt would tentatively be discharged to [facility] on Monday, 2/13 with  Hospice.   [facility] stretcher will need to be arranged, due to pt's agitation and inability to manage O2 independently. Ride time has been discussed and Marcia from [facility] as well as pt's family have requested a 1300 hospital departure time.  Hospice " rep. will be present at [facility] at 1530 on 2/13 to sign consents with pt's family; all are in agreement with DC plans.  P: Pt will DC to [facility] on 2/13 with  Hospice at 1300 via [facility] stretcher. SWS will need to arrange stretcher ride when DC date is closer and bed has been confirmed with [facility].     Chayo Michaud, LUKASZ, LGSW [phone]

## 2017-02-12 NOTE — PROGRESS NOTES
[facility]    Hospitalist Progress Note    Date of Service (when I saw the patient): 02/12/2017    Assessment & Plan   The patient is an 88-year-old man who was admitted for further treatment with weakness, chest pain, a herpes zoster outbreak and confusion.        1.  Herpes zoster reactivation: Right upper chest  - Was treated with IV acyclovir, discontinued as he is comfort care now.    2. Acute metabolic encephalopathy: Delirium- probably multifactorial; per son the patient has had cognitive decline lately, now with herpes zoster reactivation.   -Mental state apparently got worse after receiving Zyprexa 48 hours ago. There is the possibility of him having sustained a stroke however unable to do MRI given his agitation, Head CT without acute abnormality. Psychiatry consult appreciated  - Comfort care now    3.  Atrial Fibrillation: CHADsVASC-4: was on warfarin which was held due to NPO and was managed with heparin drip. Discontinued as comfort care now.    4. Aspiration pneumonia: Worsening dyspnea and hypoxia overnight 2/9 and CXR consistent with bilateral pneumonia.  - was started on Zosyn, but given worsening hypoxia, no desire to feeding tube, family decided comfort care 2/11    5.  Hyperlipidemia.     6.  History of gout.     7. CKD-III  8. Hypertension.         DVT Prophylaxis: not indicated, comfort  is goal at this time    Code Status: DNR/DNI, this was updated 2/10 after discussing with his son and wife. Comfort care now.    Disposition: Expected discharge: planned hospice discharge, but appears more imminent.   Discussed with his Son, Arnulfo.    Liu Franco MD  Hospitalist    Interval History      Drowsy, agonal breaths, appears comfortable  -unable to obtain ROS given his mental status.    -Data reviewed today: I reviewed all new labs and imaging results over the last 24 hours. I personally reviewed no images or EKG's today.    Physical Exam       BP: 132/86   Heart Rate: 94 Resp:  11 SpO2: 96 % O2 Device: Oxymask Oxygen Delivery: 10 LPM  Vitals:    02/07/17 0500 02/08/17 0720 02/09/17 0541   Weight: 66.2 kg (145 lb 15.1 oz) 67.8 kg (149 lb 7.6 oz) 69 kg (152 lb 1.9 oz)     Vital Signs with Ranges  Heart Rate:  [94] 94  Resp:  [11-24] 11  BP: (132-149)/(85-86) 132/86  SpO2:  [96 %] 96 %  I/O last 3 completed shifts:  In: 0   Out: 1000 [Urine:1000]    Constitutional: Lethargic and obtunded.  Respiratory: coarse, agonal breaths  Cardiovascular: s1s2 present  Skin/Integument: Multiple bruises both in upper and lower extremities  Neuro: obtunded    Medications        fluticasone  1 spray Both Nostrils Daily       Data     Recent Labs  Lab 02/11/17  0758 02/10/17  0828 02/09/17  0813 02/08/17  0835  02/05/17  2156   WBC 5.1 4.4 5.6  --   < > 3.8*   HGB 10.4* 10.6* 11.0*  --   < > 12.6*   MCV 92 93 94  --   < > 98   * 118* 100*  --   < > 99*   INR 2.24* 1.83* 1.40* 1.31*  < > 1.81*   NA  --  134 137 137  < > 137   POTASSIUM  --  3.7 3.8 3.9  < > 4.8   CHLORIDE  --  102 105 103  < > 102   CO2  --  23 21 23  < > 27   BUN  --  31* 32* 34*  < > 32*   CR 1.88* 1.77* 1.67* 1.71*  < > 1.79*   ANIONGAP  --  9 11 11  < > 8   DIANE  --  7.6* 8.0* 7.9*  < > 8.5   GLC  --  124* 129* 93  < > 115*   ALBUMIN  --   --   --   --   --  3.4   PROTTOTAL  --   --   --   --   --  6.8   BILITOTAL  --   --   --   --   --  0.7   ALKPHOS  --   --   --   --   --  85   ALT  --   --   --   --   --  15   AST  --   --   --   --   --  28   TROPI  --   --   --   --   --  <0.015The 99th percentile for upper reference range is 0.045 ug/L.  Troponin values in the range of 0.045 - 0.120 ug/L may be associated with risks of adverse clinical events.   < > = values in this interval not displayed.    No results found for this or any previous visit (from the past 24 hour(s)).

## 2017-02-16 LAB
BACTERIA SPEC CULT: NO GROWTH
BACTERIA SPEC CULT: NO GROWTH
Lab: NORMAL
Lab: NORMAL
MICRO REPORT STATUS: NORMAL
MICRO REPORT STATUS: NORMAL
SPECIMEN SOURCE: NORMAL
SPECIMEN SOURCE: NORMAL

## 2017-03-01 NOTE — DISCHARGE SUMMARY
[facility]    Death Summary  Hospitalist    Date of Admission:  2/5/2017  Date of Death:         2/12/17  Provider Completing Death Summary: Liu Franco MD  Date of Service (when I saw the patient): 2/12/17    Discharge Diagnoses        Herpes zoster reactivation    Aspiration pneumonia    Acute metabolic encephalopathy    Atrial Fibrillation    History of Present Illness      Jose Manuel Caceres is an 88-year-old man who was admitted for further treatment with weakness, chest pain, a herpes zoster outbreak and confusion.      Hospital Course   Jose Manuel Caceres was admitted on 2/5/2017.  The following problems were addressed during his hospitalization:      1.  Herpes zoster reactivation: Right upper chest  - Was treated with IV acyclovir, discontinued as he was comfort care.     2. Acute metabolic encephalopathy: Delirium- probably multifactorial; per son the patient has had cognitive decline lately, now with herpes zoster reactivation.   -Mental state apparently got worse after receiving Zyprexa 48 hours ago. There is the possibility of him having sustained a stroke however unable to do MRI given his agitation, Head CT without acute abnormality. Psychiatry consult appreciated  - Transitioned to comfort care      3.  Atrial Fibrillation: CHADsVASC-4: was on warfarin which was held due to NPO and was managed with heparin drip. Discontinued as care transitioned to comfort care.     4. Aspiration pneumonia: Worsening dyspnea and hypoxia overnight 2/9 and CXR consistent with bilateral pneumonia.  - was started on Zosyn, but given worsening hypoxia, no desire to feeding tube, family decided comfort care 2/11     5.  Hyperlipidemia.     6.  History of gout.     7. CKD-III  8. Hypertension.       Cause of death: Aspiration pneumonia    Liu Franco MD  Hospitalist        Pending Results   Unresulted Labs Ordered in the Past 30 Days of this Admission     No orders found from 12/7/2016 to 2/6/2017.           Primary Care Physician   Jason Phillip    Consultations This Hospital Stay   PHYSICAL THERAPY ADULT IP CONSULT  OCCUPATIONAL THERAPY ADULT IP CONSULT  INFECTIOUS DISEASES IP CONSULT  WOUND OSTOMY CONTINENCE NURSE  IP CONSULT  PHARMACY TO DOSE WARFARIN  PHARMACY TO DOSE HEPARIN  PHARMACY TO DOSE HEPARIN  PSYCHIATRY IP CONSULT  NUTRITION SERVICES ADULT IP CONSULT  SPEECH LANGUAGE PATH ADULT IP CONSULT  PHARMACY TO DOSE VANCO  SOCIAL WORK IP CONSULT    Time Spent on this Encounter   I, Liu Franco, personally saw the patient today and spent less than or equal to 30 minutes discharging this patient.    Data   Most Recent 3 CBC's:  Recent Labs   Lab Test  02/11/17   0758  02/10/17   0828  02/09/17   0813   WBC  5.1  4.4  5.6   HGB  10.4*  10.6*  11.0*   MCV  92  93  94   PLT  126*  118*  100*      Most Recent 3 BMP's:  Recent Labs   Lab Test  02/11/17   0758  02/10/17   0828  02/09/17   0813  02/08/17   0835   NA   --   134  137  137   POTASSIUM   --   3.7  3.8  3.9   CHLORIDE   --   102  105  103   CO2   --   23  21  23   BUN   --   31*  32*  34*   CR  1.88*  1.77*  1.67*  1.71*   ANIONGAP   --   9  11  11   DIANE   --   7.6*  8.0*  7.9*   GLC   --   124*  129*  93     Most Recent 2 LFT's:  Recent Labs   Lab Test  02/05/17 2156   AST  28   ALT  15   ALKPHOS  85   BILITOTAL  0.7     Most Recent INR's and Anticoagulation Dosing History:  Anticoagulation Dose History     Recent Dosing and Labs Latest Ref Rng & Units 2/5/2017 2/6/2017 2/7/2017 2/8/2017 2/9/2017 2/10/2017 2/11/2017    Warfarin 5 mg - - 5 mg - - - - -    INR 0.86 - 1.14 1.81(H) - 2.04(H) 1.31(H) 1.40(H) 1.83(H) 2.24(H)        Most Recent 3 Troponin's:  Recent Labs   Lab Test  02/05/17 2156   TROPI  <0.015  The 99th percentile for upper reference range is 0.045 ug/L.  Troponin values in   the range of 0.045 - 0.120 ug/L may be associated with risks of adverse   clinical events.       Most Recent Cholesterol Panel:No lab results found.  Most Recent  6 Bacteria Isolates From Any Culture (See EPIC Reports for Culture Details):  Recent Labs   Lab Test  02/10/17   0210  02/10/17   0205  02/05/17   2330  02/05/17   2325   CULT  No growth  No growth  No growth  No growth     Most Recent TSH, T4 and A1c Labs:No lab results found.  Results for orders placed or performed during the hospital encounter of 02/05/17   CT Head w/o & w Contrast    Narrative    CT SCAN OF THE HEAD WITHOUT AND WITH CONTRAST   2/8/2017 2:41 PM     HISTORY: Possible stroke. Agitated and unable to have MRI yesterday as  a result.    TECHNIQUE:  Axial images of the head and coronal reformations without  and with 50 mL Isovue-370. Radiation dose for this scan was reduced  using automated exposure control, adjustment of the mA and/or kV  according to patient size, or iterative reconstruction technique.    COMPARISON: 4/4/2016.    FINDINGS:  There is generalized atrophy of the brain.  There is low  attenuation in the white matter of the cerebral hemispheres consistent  with sequelae of small vessel ischemic disease. There is no evidence  of intracranial hemorrhage, mass, acute infarct or anomaly. There are  no contrast enhancing lesions.     There is chronic opacification of the left maxillary sinus with  central calcification and opacification of the left ethmoid air cell.  Remaining sinuses are clear. Right inferior mastoid air cells are  opacified. Left mastoid appears normal. There is no evidence of  trauma.       Impression    IMPRESSION:   1. No acute abnormality.  2. Atrophy of the brain.  White matter changes consistent with  sequelae of small vessel ischemic disease. This is unchanged.  3. Opacified left maxillary sinus and left anterior ethmoid air cell  and inferior right mastoid air cells. These are unchanged.      DUNCAN CENTENO MD   XR Chest Port 1 View    Narrative    XR CHEST PORT 1 VW  2/10/2017 1:15 AM      HISTORY: CHF, pneumonia.     COMPARISON: 9/1/2009.    FINDINGS: Upright  portable chest. The heart is at the upper limits of  normal in size. There is pulmonary vascular congestion without  pulmonary edema. There are bilateral pleural effusions. There is  infiltrate in the left lower lung. Possible infiltrate in the right  upper lobe. No pneumothorax. Thoracic aorta is tortuous.      Impression    IMPRESSION:  1. Left lower lobe pneumonia. Possible right upper lobe pneumonia.  2. Bilateral pleural effusions.    JUANJOSE JACOBSON MD
